# Patient Record
Sex: FEMALE | Race: OTHER
[De-identification: names, ages, dates, MRNs, and addresses within clinical notes are randomized per-mention and may not be internally consistent; named-entity substitution may affect disease eponyms.]

---

## 2017-02-02 ENCOUNTER — APPOINTMENT (OUTPATIENT)
Dept: FAMILY MEDICINE | Facility: CLINIC | Age: 62
End: 2017-02-02

## 2017-02-02 VITALS
SYSTOLIC BLOOD PRESSURE: 114 MMHG | BODY MASS INDEX: 22.97 KG/M2 | TEMPERATURE: 101.7 F | HEART RATE: 97 BPM | WEIGHT: 117 LBS | HEIGHT: 60 IN | OXYGEN SATURATION: 98 % | DIASTOLIC BLOOD PRESSURE: 70 MMHG

## 2017-02-02 LAB
FLUAV SPEC QL CULT: ABNORMAL
FLUBV AG SPEC QL IA: NORMAL

## 2017-02-06 ENCOUNTER — EMERGENCY (EMERGENCY)
Facility: HOSPITAL | Age: 62
LOS: 1 days | Discharge: HOME | End: 2017-02-06
Attending: EMERGENCY MEDICINE
Payer: MEDICAID

## 2017-02-06 VITALS
DIASTOLIC BLOOD PRESSURE: 85 MMHG | HEIGHT: 60 IN | OXYGEN SATURATION: 99 % | WEIGHT: 115.08 LBS | SYSTOLIC BLOOD PRESSURE: 128 MMHG | HEART RATE: 60 BPM | TEMPERATURE: 97 F | RESPIRATION RATE: 18 BRPM

## 2017-02-06 DIAGNOSIS — L03.012 CELLULITIS OF LEFT FINGER: ICD-10-CM

## 2017-02-06 DIAGNOSIS — Z91.018 ALLERGY TO OTHER FOODS: ICD-10-CM

## 2017-02-06 DIAGNOSIS — Z90.89 ACQUIRED ABSENCE OF OTHER ORGANS: ICD-10-CM

## 2017-02-06 DIAGNOSIS — M79.645 PAIN IN LEFT FINGER(S): ICD-10-CM

## 2017-02-06 PROCEDURE — 10060 I&D ABSCESS SIMPLE/SINGLE: CPT

## 2017-02-06 PROCEDURE — 10060 I&D ABSCESS SIMPLE/SINGLE: CPT | Mod: F2

## 2017-02-06 PROCEDURE — 99282 EMERGENCY DEPT VISIT SF MDM: CPT | Mod: 25

## 2017-02-06 RX ORDER — LEVOTHYROXINE SODIUM 125 MCG
0 TABLET ORAL
Qty: 0 | Refills: 0 | COMMUNITY

## 2017-02-06 RX ORDER — CALCITRIOL 0.5 UG/1
0 CAPSULE ORAL
Qty: 0 | Refills: 0 | COMMUNITY

## 2017-02-06 NOTE — ED STATDOCS - OBJECTIVE STATEMENT
62 y/o female presents to ED c/o pain, erythema and swelling to left 3rd finger x 1 week. Pt reports that she pulled a portion of her cuticle from her left 3rd fingernail ~1 week ago and symptoms developed progressively. Pt applied warm compresses to her finger, with no relief. No h/o DM. No further complaints at this time. PMD Dr. Fiona Padron.

## 2017-02-06 NOTE — ED STATDOCS - NS ED MD SCRIBE ATTENDING SCRIBE SECTIONS
REVIEW OF SYSTEMS/VITAL SIGNS( Pullset)/PHYSICAL EXAM/DISPOSITION/HISTORY OF PRESENT ILLNESS/HIV/PAST MEDICAL/SURGICAL/SOCIAL HISTORY

## 2017-02-06 NOTE — ED ADULT TRIAGE NOTE - CHIEF COMPLAINT QUOTE
patient arrived ambulatory to ED , awake, alert and oriented times 3, breathing unlabored.  patient complaining of middle finger to left hand pain, swelling, and redness which has been present for 1 week.  No drainage. No fevers

## 2017-02-06 NOTE — ED STATDOCS - ATTENDING CONTRIBUTION TO CARE
I, Carlos Chaney, performed the initial face to face bedside interview with this patient regarding history of present illness, review of symptoms and relevant past medical, social and family history.  I completed an independent physical examination.  I was the initial provider who evaluated this patient. I have signed out the follow up of any pending tests (i.e. labs, radiological studies) to the ACP.  I have communicated the patient’s plan of care and disposition with the ACP.  The history, relevant review of systems, past medical and surgical history, medical decision making, and physical examination was documented by the scribe in my presence and I attest to the accuracy of the documentation.

## 2017-02-13 ENCOUNTER — APPOINTMENT (OUTPATIENT)
Dept: FAMILY MEDICINE | Facility: CLINIC | Age: 62
End: 2017-02-13

## 2017-02-13 VITALS
HEIGHT: 60 IN | WEIGHT: 116 LBS | HEART RATE: 88 BPM | BODY MASS INDEX: 22.78 KG/M2 | SYSTOLIC BLOOD PRESSURE: 120 MMHG | DIASTOLIC BLOOD PRESSURE: 80 MMHG

## 2017-02-13 RX ORDER — OSELTAMIVIR PHOSPHATE 75 MG/1
75 CAPSULE ORAL TWICE DAILY
Qty: 10 | Refills: 0 | Status: DISCONTINUED | COMMUNITY
Start: 2017-02-02 | End: 2017-02-13

## 2018-01-05 ENCOUNTER — APPOINTMENT (OUTPATIENT)
Dept: FAMILY MEDICINE | Facility: CLINIC | Age: 63
End: 2018-01-05
Payer: MEDICAID

## 2018-01-05 VITALS
SYSTOLIC BLOOD PRESSURE: 120 MMHG | HEART RATE: 84 BPM | BODY MASS INDEX: 23.16 KG/M2 | HEIGHT: 60 IN | DIASTOLIC BLOOD PRESSURE: 82 MMHG | WEIGHT: 118 LBS

## 2018-01-05 DIAGNOSIS — Z87.09 PERSONAL HISTORY OF OTHER DISEASES OF THE RESPIRATORY SYSTEM: ICD-10-CM

## 2018-01-05 DIAGNOSIS — Z87.898 PERSONAL HISTORY OF OTHER SPECIFIED CONDITIONS: ICD-10-CM

## 2018-01-05 DIAGNOSIS — M85.80 OTHER SPECIFIED DISORDERS OF BONE DENSITY AND STRUCTURE, UNSPECIFIED SITE: ICD-10-CM

## 2018-01-05 PROCEDURE — G0008: CPT

## 2018-01-05 PROCEDURE — 99214 OFFICE O/P EST MOD 30 MIN: CPT | Mod: 25

## 2018-01-05 PROCEDURE — 90686 IIV4 VACC NO PRSV 0.5 ML IM: CPT

## 2018-01-09 ENCOUNTER — FORM ENCOUNTER (OUTPATIENT)
Age: 63
End: 2018-01-09

## 2018-01-10 ENCOUNTER — APPOINTMENT (OUTPATIENT)
Dept: RADIOLOGY | Facility: CLINIC | Age: 63
End: 2018-01-10
Payer: MEDICAID

## 2018-01-10 ENCOUNTER — OUTPATIENT (OUTPATIENT)
Dept: OUTPATIENT SERVICES | Facility: HOSPITAL | Age: 63
LOS: 1 days | End: 2018-01-10
Payer: MEDICAID

## 2018-01-10 DIAGNOSIS — M85.80 OTHER SPECIFIED DISORDERS OF BONE DENSITY AND STRUCTURE, UNSPECIFIED SITE: ICD-10-CM

## 2018-01-10 PROCEDURE — 77080 DXA BONE DENSITY AXIAL: CPT

## 2018-01-10 PROCEDURE — 77080 DXA BONE DENSITY AXIAL: CPT | Mod: 26

## 2018-01-16 LAB
25(OH)D3 SERPL-MCNC: 45.7 NG/ML
ALBUMIN SERPL ELPH-MCNC: 4.2 G/DL
ALP BLD-CCNC: 61 U/L
ALT SERPL-CCNC: 32 U/L
ANION GAP SERPL CALC-SCNC: 16 MMOL/L
APPEARANCE: CLEAR
AST SERPL-CCNC: 27 U/L
BASOPHILS # BLD AUTO: 0.02 K/UL
BASOPHILS NFR BLD AUTO: 0.3 %
BILIRUB SERPL-MCNC: 0.6 MG/DL
BILIRUBIN URINE: NEGATIVE
BLOOD URINE: NEGATIVE
BUN SERPL-MCNC: 22 MG/DL
CALCIUM SERPL-MCNC: 8.5 MG/DL
CHLORIDE SERPL-SCNC: 98 MMOL/L
CHOLEST SERPL-MCNC: 229 MG/DL
CHOLEST/HDLC SERPL: 4.7 RATIO
CO2 SERPL-SCNC: 28 MMOL/L
COLOR: YELLOW
CREAT SERPL-MCNC: 0.88 MG/DL
EOSINOPHIL # BLD AUTO: 0.1 K/UL
EOSINOPHIL NFR BLD AUTO: 1.6 %
GLUCOSE QUALITATIVE U: NEGATIVE MG/DL
GLUCOSE SERPL-MCNC: 84 MG/DL
HCT VFR BLD CALC: 43.5 %
HDLC SERPL-MCNC: 49 MG/DL
HEMOCCULT STL QL IA: NEGATIVE
HGB BLD-MCNC: 15 G/DL
IMM GRANULOCYTES NFR BLD AUTO: 0.2 %
KETONES URINE: NEGATIVE
LDLC SERPL CALC-MCNC: 161 MG/DL
LEUKOCYTE ESTERASE URINE: NEGATIVE
LYMPHOCYTES # BLD AUTO: 2.65 K/UL
LYMPHOCYTES NFR BLD AUTO: 41.7 %
MAN DIFF?: NORMAL
MCHC RBC-ENTMCNC: 29.1 PG
MCHC RBC-ENTMCNC: 34.5 GM/DL
MCV RBC AUTO: 84.5 FL
MONOCYTES # BLD AUTO: 0.39 K/UL
MONOCYTES NFR BLD AUTO: 6.1 %
NEUTROPHILS # BLD AUTO: 3.18 K/UL
NEUTROPHILS NFR BLD AUTO: 50.1 %
NITRITE URINE: NEGATIVE
PH URINE: 6.5
PLATELET # BLD AUTO: 199 K/UL
POTASSIUM SERPL-SCNC: 4.3 MMOL/L
PROT SERPL-MCNC: 7.4 G/DL
PROTEIN URINE: NEGATIVE MG/DL
RBC # BLD: 5.15 M/UL
RBC # FLD: 14.8 %
SODIUM SERPL-SCNC: 142 MMOL/L
SPECIFIC GRAVITY URINE: 1.02
TRIGL SERPL-MCNC: 97 MG/DL
UROBILINOGEN URINE: NEGATIVE MG/DL
WBC # FLD AUTO: 6.35 K/UL

## 2018-03-22 ENCOUNTER — APPOINTMENT (OUTPATIENT)
Dept: GASTROENTEROLOGY | Facility: CLINIC | Age: 63
End: 2018-03-22

## 2018-05-02 ENCOUNTER — APPOINTMENT (OUTPATIENT)
Dept: GASTROENTEROLOGY | Facility: CLINIC | Age: 63
End: 2018-05-02
Payer: MEDICAID

## 2018-05-02 VITALS
SYSTOLIC BLOOD PRESSURE: 144 MMHG | WEIGHT: 120 LBS | HEART RATE: 56 BPM | BODY MASS INDEX: 23.56 KG/M2 | HEIGHT: 60 IN | RESPIRATION RATE: 14 BRPM | DIASTOLIC BLOOD PRESSURE: 81 MMHG

## 2018-05-02 PROCEDURE — 99204 OFFICE O/P NEW MOD 45 MIN: CPT

## 2018-05-07 ENCOUNTER — RX RENEWAL (OUTPATIENT)
Age: 63
End: 2018-05-07

## 2018-06-22 ENCOUNTER — APPOINTMENT (OUTPATIENT)
Dept: GASTROENTEROLOGY | Facility: GI CENTER | Age: 63
End: 2018-06-22
Payer: MEDICAID

## 2018-06-22 ENCOUNTER — OUTPATIENT (OUTPATIENT)
Dept: OUTPATIENT SERVICES | Facility: HOSPITAL | Age: 63
LOS: 1 days | End: 2018-06-22
Payer: MEDICAID

## 2018-06-22 ENCOUNTER — RESULT REVIEW (OUTPATIENT)
Age: 63
End: 2018-06-22

## 2018-06-22 VITALS
TEMPERATURE: 98 F | BODY MASS INDEX: 23.56 KG/M2 | RESPIRATION RATE: 14 BRPM | SYSTOLIC BLOOD PRESSURE: 140 MMHG | HEART RATE: 55 BPM | OXYGEN SATURATION: 98 % | DIASTOLIC BLOOD PRESSURE: 80 MMHG | WEIGHT: 120 LBS | HEIGHT: 60 IN

## 2018-06-22 DIAGNOSIS — K63.5 POLYP OF COLON: ICD-10-CM

## 2018-06-22 DIAGNOSIS — Z12.11 ENCOUNTER FOR SCREENING FOR MALIGNANT NEOPLASM OF COLON: ICD-10-CM

## 2018-06-22 PROCEDURE — 88305 TISSUE EXAM BY PATHOLOGIST: CPT

## 2018-06-22 PROCEDURE — 45380 COLONOSCOPY AND BIOPSY: CPT | Mod: 33

## 2018-06-22 PROCEDURE — 45380 COLONOSCOPY AND BIOPSY: CPT | Mod: PT

## 2018-06-22 PROCEDURE — 88305 TISSUE EXAM BY PATHOLOGIST: CPT | Mod: 26

## 2018-06-22 RX ORDER — POLYETHYLENE GLYCOL 3350, SODIUM SULFATE, SODIUM CHLORIDE, POTASSIUM CHLORIDE, ASCORBIC ACID, SODIUM ASCORBATE 7.5-2.691G
100 KIT ORAL
Qty: 1 | Refills: 0 | Status: COMPLETED | COMMUNITY
Start: 2018-05-02 | End: 2018-06-22

## 2018-06-22 RX ORDER — POLYETHYLENE GLYOCOL 3350, SODIUM CHLORIDE, SODIUM BICARBONATE AND POTASSIUM CHLORIDE 420; 11.2; 5.72; 1.48 G/4L; G/4L; G/4L; G/4L
420 POWDER, FOR SOLUTION NASOGASTRIC; ORAL
Qty: 1 | Refills: 0 | Status: COMPLETED | COMMUNITY
Start: 2018-05-07 | End: 2018-06-22

## 2018-06-23 ENCOUNTER — TRANSCRIPTION ENCOUNTER (OUTPATIENT)
Age: 63
End: 2018-06-23

## 2018-06-27 LAB — SURGICAL PATHOLOGY FINAL REPORT - CH: SIGNIFICANT CHANGE UP

## 2018-07-26 ENCOUNTER — OTHER (OUTPATIENT)
Age: 63
End: 2018-07-26

## 2018-08-12 ENCOUNTER — FORM ENCOUNTER (OUTPATIENT)
Age: 63
End: 2018-08-12

## 2018-08-13 ENCOUNTER — APPOINTMENT (OUTPATIENT)
Dept: FAMILY MEDICINE | Facility: CLINIC | Age: 63
End: 2018-08-13
Payer: MEDICAID

## 2018-08-13 ENCOUNTER — OUTPATIENT (OUTPATIENT)
Dept: OUTPATIENT SERVICES | Facility: HOSPITAL | Age: 63
LOS: 1 days | End: 2018-08-13
Payer: MEDICAID

## 2018-08-13 ENCOUNTER — APPOINTMENT (OUTPATIENT)
Dept: RADIOLOGY | Facility: CLINIC | Age: 63
End: 2018-08-13
Payer: MEDICAID

## 2018-08-13 VITALS
WEIGHT: 116 LBS | DIASTOLIC BLOOD PRESSURE: 76 MMHG | OXYGEN SATURATION: 99 % | SYSTOLIC BLOOD PRESSURE: 119 MMHG | HEIGHT: 60 IN | HEART RATE: 53 BPM | BODY MASS INDEX: 22.78 KG/M2

## 2018-08-13 DIAGNOSIS — M25.561 PAIN IN RIGHT KNEE: ICD-10-CM

## 2018-08-13 DIAGNOSIS — Z92.29 PERSONAL HISTORY OF OTHER DRUG THERAPY: ICD-10-CM
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
## 2018-08-13 PROBLEM — E89.0 POSTPROCEDURAL HYPOTHYROIDISM: Chronic | Status: ACTIVE | Noted: 2017-02-06

## 2018-08-13 PROCEDURE — 99213 OFFICE O/P EST LOW 20 MIN: CPT

## 2018-08-13 PROCEDURE — 73562 X-RAY EXAM OF KNEE 3: CPT | Mod: 26,RT

## 2018-08-13 PROCEDURE — 73562 X-RAY EXAM OF KNEE 3: CPT

## 2018-08-13 NOTE — PLAN
[FreeTextEntry1] : AVOID ACTIVITIES CAUSING PAIN\par CHECK XRAY KNEE\par FURTHER RECOMMENDATIONS PENDING RESULTS\par TYLENOL OR MOTRIN PRN AS DIRECTED\par CALL WITH ANY QUESTIONS, CONCERNS OR CHANGES \par FLU VACCINE IN THE FALL RECOMMENDED

## 2018-08-13 NOTE — HISTORY OF PRESENT ILLNESS
[FreeTextEntry8] : PRESENTING FOR ACUTE VISIT.  NOTES THAT THREE WEEKS AGO WHILE IN [facility] FELL AND HIT HER RIGHT KNEE.  HAS IMPROVED BUT STILL HAS PAIN.  NO SWELLING.  NO NUMBNESS/TINGLING.  ONLY PAIN WITH MOVEMENT.  NO MEDICATIONS TAKEN FOR SYMPTOMS.  NO FEVER.  NO PRIOR INJURY.  AT FIRST HAS DIFFICULTY WALKING BUT NOW "MUCH BETTER".  NO OTHER INJURY OR CONCERNS TODAY.

## 2018-08-13 NOTE — PHYSICAL EXAM
[No Acute Distress] : no acute distress [Well Nourished] : well nourished [Well-Appearing] : well-appearing [No Respiratory Distress] : no respiratory distress  [Clear to Auscultation] : lungs were clear to auscultation bilaterally [No Accessory Muscle Use] : no accessory muscle use [Normal Rate] : normal rate  [Regular Rhythm] : with a regular rhythm [Normal S1, S2] : normal S1 and S2 [No Joint Swelling] : no joint swelling [Grossly Normal Strength/Tone] : grossly normal strength/tone [de-identified] : NON-TENDER.  DISTAL PULSES INTACT.

## 2018-08-16 ENCOUNTER — RESULT REVIEW (OUTPATIENT)
Age: 63
End: 2018-08-16

## 2018-09-24 ENCOUNTER — APPOINTMENT (OUTPATIENT)
Dept: GASTROENTEROLOGY | Facility: CLINIC | Age: 63
End: 2018-09-24

## 2018-12-24 ENCOUNTER — APPOINTMENT (OUTPATIENT)
Dept: FAMILY MEDICINE | Facility: CLINIC | Age: 63
End: 2018-12-24
Payer: MEDICAID

## 2018-12-24 VITALS
HEIGHT: 60 IN | WEIGHT: 123 LBS | DIASTOLIC BLOOD PRESSURE: 82 MMHG | OXYGEN SATURATION: 97 % | SYSTOLIC BLOOD PRESSURE: 120 MMHG | TEMPERATURE: 97.5 F | BODY MASS INDEX: 24.15 KG/M2 | HEART RATE: 65 BPM

## 2018-12-24 PROCEDURE — 99213 OFFICE O/P EST LOW 20 MIN: CPT

## 2018-12-24 NOTE — HISTORY OF PRESENT ILLNESS
[Congestion] : congestion [Cold Symptoms] : cold symptoms [Sore Throat] : sore throat [de-identified] : SNEEZING  [FreeTextEntry8] : PATIENT PRESENTS FOR ACUTE VISIT. SHE REPORTS THAT HER SYMPTOMS FIRST BEGAN 3 WEEKS AGO. INITIALLY SNEEZING AND CONGESTION.  HAD RELIEF WITH ALKASELTZER.  NOW COUGH PRESENTED 3 DAYS AGO, WORSE AT NIGHT AND IN THE MORNING.  HAS POSTNASAL DRIP AND SORE THROAT.  TOOK COUGH DROPS.  REPORTS SYMPTOMS ARE IMPROVING.  DENIES ANY CHEST PAIN, SHORTNESS OF BREATH, DIZZINESS, HEADACHES, GI OR URINARY SYMPTOMS.  GRANDCHILD WAS ALSO SICK.  NO OTHER COMPLAINTS TODAY.

## 2018-12-24 NOTE — PHYSICAL EXAM
[No Respiratory Distress] : no respiratory distress  [Clear to Auscultation] : lungs were clear to auscultation bilaterally [No Accessory Muscle Use] : no accessory muscle use [Normal S1, S2] : normal S1 and S2 [No Murmur] : no murmur heard [No Acute Distress] : no acute distress [Well Nourished] : well nourished [Well-Appearing] : well-appearing [Normal Outer Ear/Nose] : the outer ears and nose were normal in appearance [Normal Oropharynx] : the oropharynx was normal [Normal TMs] : both tympanic membranes were normal [Supple] : supple [No Lymphadenopathy] : no lymphadenopathy [de-identified] : SCANT CERUMEN

## 2018-12-24 NOTE — ASSESSMENT
[FreeTextEntry1] : SUPPORTIVE CARE: REST, FLUIDS, STEAM\par MUCINEX\par RX NASAL SPRAY- REVIEWED USE\par LIKELY VIRAL URI; RX ABX TO ONLY FILL IF SYMPTOMS PERSIST/WORSEN - CALL TO DISCUSS\par CALL WITH ANY QUESTIONS, COMMENTS, OR CONCERNS\par FOLLOW-UP FOR FLU VACCINE AND ROUTINE CARE

## 2018-12-24 NOTE — REVIEW OF SYSTEMS
[Sore Throat] : sore throat [Postnasal Drip] : postnasal drip [Negative] : Cardiovascular [Nasal Discharge] : nasal discharge [Cough] : cough [Shortness Of Breath] : no shortness of breath [Wheezing] : no wheezing

## 2018-12-24 NOTE — ADDENDUM
[FreeTextEntry1] : I, Chula Aranda, acted solely as a scribe for Dr. Jesica Padron on this date 12/24/18.\par

## 2019-01-17 ENCOUNTER — OTHER (OUTPATIENT)
Age: 64
End: 2019-01-17

## 2019-01-23 ENCOUNTER — APPOINTMENT (OUTPATIENT)
Dept: FAMILY MEDICINE | Facility: CLINIC | Age: 64
End: 2019-01-23
Payer: MEDICAID

## 2019-01-23 ENCOUNTER — RX RENEWAL (OUTPATIENT)
Age: 64
End: 2019-01-23

## 2019-01-23 VITALS
SYSTOLIC BLOOD PRESSURE: 100 MMHG | HEIGHT: 60 IN | DIASTOLIC BLOOD PRESSURE: 60 MMHG | WEIGHT: 124 LBS | HEART RATE: 84 BPM | BODY MASS INDEX: 24.35 KG/M2
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
## 2019-01-23 DIAGNOSIS — J06.9 ACUTE UPPER RESPIRATORY INFECTION, UNSPECIFIED: ICD-10-CM

## 2019-01-23 PROCEDURE — 90686 IIV4 VACC NO PRSV 0.5 ML IM: CPT

## 2019-01-23 PROCEDURE — 99396 PREV VISIT EST AGE 40-64: CPT | Mod: 25

## 2019-01-23 PROCEDURE — G0008: CPT

## 2019-01-23 RX ORDER — AMOXICILLIN 500 MG/1
500 TABLET, FILM COATED ORAL 3 TIMES DAILY
Qty: 30 | Refills: 0 | Status: DISCONTINUED | COMMUNITY
Start: 2018-12-24 | End: 2019-01-23

## 2019-01-23 NOTE — ADDENDUM
[FreeTextEntry1] : I, Chula Aranda, acted solely as a scribe for Dr. Jesica Padron on this date 1/23/19.

## 2019-01-23 NOTE — PHYSICAL EXAM
[No Acute Distress] : no acute distress [Well Nourished] : well nourished [Well-Appearing] : well-appearing [Normal Sclera/Conjunctiva] : normal sclera/conjunctiva [PERRL] : pupils equal round and reactive to light [Normal Outer Ear/Nose] : the outer ears and nose were normal in appearance [Normal Oropharynx] : the oropharynx was normal [No JVD] : no jugular venous distention [Supple] : supple [No Lymphadenopathy] : no lymphadenopathy [Thyroid Normal, No Nodules] : the thyroid was normal and there were no nodules present [No Respiratory Distress] : no respiratory distress  [Clear to Auscultation] : lungs were clear to auscultation bilaterally [No Accessory Muscle Use] : no accessory muscle use [Normal Rate] : normal rate  [Regular Rhythm] : with a regular rhythm [Normal S1, S2] : normal S1 and S2 [No Murmur] : no murmur heard [Pedal Pulses Present] : the pedal pulses are present [Soft] : abdomen soft [Non Tender] : non-tender [Normal Bowel Sounds] : normal bowel sounds [Normal Gait] : normal gait [Coordination Grossly Intact] : coordination grossly intact [No Focal Deficits] : no focal deficits [Deep Tendon Reflexes (DTR)] : deep tendon reflexes were 2+ and symmetric [Speech Grossly Normal] : speech grossly normal [Normal Affect] : the affect was normal [Normal Mood] : the mood was normal

## 2019-01-23 NOTE — ASSESSMENT
[FreeTextEntry1] : RX ROUTINE LAB WORK AND URINALYSIS\par VISION SCREENING\par FLU VACCINE GIVEN AND TOLERATED WELL \par FOLLOW UP ALL SPECIALISTS AS DIRECTED\par CONSIDER SHINGRIX VACCINE \par CALL WITH ANY QUESTIONS, CONCERNS OR CHANGES

## 2019-01-23 NOTE — HISTORY OF PRESENT ILLNESS
[FreeTextEntry1] : PHYSICAL  [de-identified] : PRESENTING FOR YEARLY WELLNESS EXAM. DOING WELL.  SEEING GYN ROUTINELY. HAD TRANSVAGINAL US YESTERDAY, GOES FOR ROUTINE BREAST IMAGING.  CHRONIC HISTORY OF HYPOTHYROIDISM.  CONTINUES ON SYNTHROID.   DENIES FEVER, CHEST PAIN, SHORTNESS OF BREATH, HEADACHE, DIZZINESS, GI OR URINARY SYMPTOMS. NO OTHER SYMPTOMS. WOULD LIKE FLU SHOT TODAY.  NO PRIOR ADVERSE REACTIONS TO VACCINATIONS.

## 2019-01-23 NOTE — HEALTH RISK ASSESSMENT
[Very Good] : ~his/her~  mood as very good [No falls in past year] : Patient reported no falls in the past year [0] : 2) Feeling down, depressed, or hopeless: Not at all (0) [Patient reported PAP Smear was normal] : Patient reported PAP Smear was normal [Patient reported colonoscopy was normal] : Patient reported colonoscopy was normal [HIV test declined] : HIV test declined [Hepatitis C test declined] : Hepatitis C test declined [None] : None [With Significant Other] : lives with significant other [# of Members in Household ___] :  household currently consist of [unfilled] member(s) [Retired] : retired [College] : College [] :  [# Of Children ___] : has [unfilled] children [Feels Safe at Home] : Feels safe at home [Fully functional (bathing, dressing, toileting, transferring, walking, feeding)] : Fully functional (bathing, dressing, toileting, transferring, walking, feeding) [Fully functional (using the telephone, shopping, preparing meals, housekeeping, doing laundry, using] : Fully functional and needs no help or supervision to perform IADLs (using the telephone, shopping, preparing meals, housekeeping, doing laundry, using transportation, managing medications and managing finances) [Smoke Detector] : smoke detector [Carbon Monoxide Detector] : carbon monoxide detector [Seat Belt] :  uses seat belt [Sunscreen] : uses sunscreen [Discussed at today's visit] : Advance Directives Discussed at today's visit [Patient reported mammogram was normal] : Patient reported mammogram was normal [Safety elements used in home] : safety elements used in home [] : No [[id number]] : 0 [[id number]] : 0 [Change in mental status noted] : No change in mental status noted [Language] : denies difficulty with language [Behavior] : denies difficulty with behavior [Learning/Retaining New Information] : denies difficulty learning/retaining new information [Handling Complex Tasks] : denies difficulty handling complex tasks [Reasoning] : denies difficulty with reasoning [Spatial Ability and Orientation] : denies difficulty with spatial ability and orientation [Reports changes in hearing] : Reports no changes in hearing [Reports changes in vision] : Reports no changes in vision [Reports changes in dental health] : Reports no changes in dental health [MammogramDate] : 01/19 [PapSmearDate] : 01/19  [BoneDensityDate] : 01/18 [ColonoscopyDate] : 07/18 [de-identified] : WEARS GLASSES FOR READING AND DISTANCE

## 2019-01-25 ENCOUNTER — OTHER (OUTPATIENT)
Age: 64
End: 2019-01-25

## 2019-01-25 LAB
BASOPHILS # BLD AUTO: 0.02 K/UL
BASOPHILS NFR BLD AUTO: 0.4 %
EOSINOPHIL # BLD AUTO: 0.14 K/UL
EOSINOPHIL NFR BLD AUTO: 2.7 %
HCT VFR BLD CALC: 42.6 %
HGB BLD-MCNC: 13.9 G/DL
IMM GRANULOCYTES NFR BLD AUTO: 0.2 %
LYMPHOCYTES # BLD AUTO: 1.89 K/UL
LYMPHOCYTES NFR BLD AUTO: 36.9 %
MAN DIFF?: NORMAL
MCHC RBC-ENTMCNC: 28 PG
MCHC RBC-ENTMCNC: 32.6 GM/DL
MCV RBC AUTO: 85.9 FL
MONOCYTES # BLD AUTO: 0.3 K/UL
MONOCYTES NFR BLD AUTO: 5.9 %
NEUTROPHILS # BLD AUTO: 2.76 K/UL
NEUTROPHILS NFR BLD AUTO: 53.9 %
PLATELET # BLD AUTO: 206 K/UL
RBC # BLD: 4.96 M/UL
RBC # FLD: 15.1 %
WBC # FLD AUTO: 5.12 K/UL

## 2019-02-12 LAB
25(OH)D3 SERPL-MCNC: 14 NG/ML
ALBUMIN SERPL ELPH-MCNC: 4.1 G/DL
ALP BLD-CCNC: 63 U/L
ALT SERPL-CCNC: 33 U/L
ANION GAP SERPL CALC-SCNC: 12 MMOL/L
AST SERPL-CCNC: 23 U/L
BILIRUB SERPL-MCNC: 0.3 MG/DL
BUN SERPL-MCNC: 23 MG/DL
CALCIUM SERPL-MCNC: 8.2 MG/DL
CHLORIDE SERPL-SCNC: 102 MMOL/L
CHOLEST SERPL-MCNC: 218 MG/DL
CHOLEST/HDLC SERPL: 4.4 RATIO
CO2 SERPL-SCNC: 26 MMOL/L
CREAT SERPL-MCNC: 0.73 MG/DL
GLUCOSE SERPL-MCNC: 84 MG/DL
HDLC SERPL-MCNC: 49 MG/DL
LDLC SERPL CALC-MCNC: 152 MG/DL
POTASSIUM SERPL-SCNC: 4.2 MMOL/L
PROT SERPL-MCNC: 6.9 G/DL
SODIUM SERPL-SCNC: 139 MMOL/L
TRIGL SERPL-MCNC: 85 MG/DL

## 2019-03-01 ENCOUNTER — OUTPATIENT (OUTPATIENT)
Dept: OUTPATIENT SERVICES | Facility: HOSPITAL | Age: 64
LOS: 1 days | End: 2019-03-01

## 2019-03-11 ENCOUNTER — APPOINTMENT (OUTPATIENT)
Dept: ORTHOPEDIC SURGERY | Facility: CLINIC | Age: 64
End: 2019-03-11
Payer: MEDICAID

## 2019-03-11 VITALS
HEART RATE: 48 BPM | DIASTOLIC BLOOD PRESSURE: 78 MMHG | WEIGHT: 124 LBS | BODY MASS INDEX: 24.35 KG/M2 | SYSTOLIC BLOOD PRESSURE: 130 MMHG | HEIGHT: 60 IN

## 2019-03-11 DIAGNOSIS — M18.12 UNILATERAL PRIMARY OSTEOARTHRITIS OF FIRST CARPOMETACARPAL JOINT, RIGHT HAND: ICD-10-CM

## 2019-03-11 DIAGNOSIS — M18.11 UNILATERAL PRIMARY OSTEOARTHRITIS OF FIRST CARPOMETACARPAL JOINT, RIGHT HAND: ICD-10-CM

## 2019-03-11 PROCEDURE — 73140 X-RAY EXAM OF FINGER(S): CPT | Mod: F5

## 2019-03-11 PROCEDURE — 99204 OFFICE O/P NEW MOD 45 MIN: CPT

## 2019-03-11 NOTE — ASSESSMENT
[FreeTextEntry1] : the patient is a 63-year-old female with pain at the base of bilateral thumbs consistent with mild basal joint arthritis. Recommend conservative treatment at this time with use of a comfort cool immobilizer as needed as well as a course of anti-inflammatory medication.  if this is not adequately controlling her pain she will return for possible cortisone injection.

## 2019-03-11 NOTE — PHYSICAL EXAM
[Normal RUE] : Right Upper Extremity: No scars, rashes, lesions, ulcers, skin intact [Normal LUE] : Left Upper Extremity: No scars, rashes, lesions, ulcers, skin intact [Normal Finger/nose] : finger to nose coordination [Normal] : no peripheral adenopathy appreciated [de-identified] : There is tenderness over the bilateral basal joints with a positive grind test, no shoulder deformity. There is a positive crank test. There is no swelling appreciated over the CMC joints. There is no evidence of infection or lymphadenopathy bilaterally to the level of the elbows There is no evidence of MCP hyperextension bilaterally. There is a negative Finkelstein's test There is no tenderness over the A-1 pulleys bilaterally. 5/5 stregnth bilaterally. \par \par The wrists have a symmetric and full range of motion bilaterally with no pain upon forced flexion, extension, pronation and supination. There is no tenderness over the scaphoid scapholunate region ligaments and no tenderness of the TFCC bilaterally. There is no tenderness of the pisotriquetral hamate hook. The second through fifth CMC his is stable and nontender bilaterally.\par There is a negative carpal tunnel compression test or Tinel's bilaterally.   [de-identified] : elyser [de-identified] : 3 x-ray views of bilateral thumbs are reviewed there is mild joint space narrowing of the basal joints bilaterally no fracture or dislocation

## 2019-03-11 NOTE — HISTORY OF PRESENT ILLNESS
[FreeTextEntry1] : the patient is a 63-year-old female who presents for evaluation of bilateral thumb pain right worse than left. She has a dull pain located at the base of the thumbs with circumferential radiation.  Her pain hasn't present for several weeks she denies trauma once I had. Her pain is worse with activity and improved with rest. She denies paresthesias.

## 2019-04-24 DIAGNOSIS — Z71.89 OTHER SPECIFIED COUNSELING: ICD-10-CM

## 2019-05-31 ENCOUNTER — OTHER (OUTPATIENT)
Age: 64
End: 2019-05-31

## 2019-10-27 ENCOUNTER — FORM ENCOUNTER (OUTPATIENT)
Age: 64
End: 2019-10-27

## 2019-10-28 ENCOUNTER — APPOINTMENT (OUTPATIENT)
Dept: FAMILY MEDICINE | Facility: CLINIC | Age: 64
End: 2019-10-28
Payer: MEDICAID

## 2019-10-28 ENCOUNTER — APPOINTMENT (OUTPATIENT)
Dept: RADIOLOGY | Facility: CLINIC | Age: 64
End: 2019-10-28
Payer: MEDICAID

## 2019-10-28 ENCOUNTER — OUTPATIENT (OUTPATIENT)
Dept: OUTPATIENT SERVICES | Facility: HOSPITAL | Age: 64
LOS: 1 days | End: 2019-10-28
Payer: MEDICAID

## 2019-10-28 VITALS
HEART RATE: 60 BPM | WEIGHT: 124 LBS | BODY MASS INDEX: 23.41 KG/M2 | HEIGHT: 61 IN | DIASTOLIC BLOOD PRESSURE: 70 MMHG | SYSTOLIC BLOOD PRESSURE: 128 MMHG

## 2019-10-28 DIAGNOSIS — M79.674 PAIN IN RIGHT TOE(S): ICD-10-CM

## 2019-10-28 DIAGNOSIS — Z92.29 PERSONAL HISTORY OF OTHER DRUG THERAPY: ICD-10-CM

## 2019-10-28 PROCEDURE — 73630 X-RAY EXAM OF FOOT: CPT

## 2019-10-28 PROCEDURE — 90471 IMMUNIZATION ADMIN: CPT

## 2019-10-28 PROCEDURE — 99214 OFFICE O/P EST MOD 30 MIN: CPT | Mod: 25

## 2019-10-28 PROCEDURE — 90686 IIV4 VACC NO PRSV 0.5 ML IM: CPT

## 2019-10-28 PROCEDURE — 73630 X-RAY EXAM OF FOOT: CPT | Mod: 26,RT

## 2019-10-28 NOTE — REVIEW OF SYSTEMS
[Joint Pain] : joint pain [Fever] : no fever [Chills] : no chills [Fatigue] : no fatigue [Chest Pain] : no chest pain [Palpitations] : no palpitations [Lower Ext Edema] : no lower extremity edema [Shortness Of Breath] : no shortness of breath [Wheezing] : no wheezing [Cough] : no cough [Abdominal Pain] : no abdominal pain [Diarrhea] : diarrhea [Vomiting] : no vomiting [Muscle Weakness] : no muscle weakness [Muscle Pain] : no muscle pain [Back Pain] : no back pain [Headache] : no headache [Dizziness] : no dizziness [Fainting] : no fainting [Easy Bleeding] : no easy bleeding [Easy Bruising] : no easy bruising [FreeTextEntry9] : SEE HPI.

## 2019-10-28 NOTE — HISTORY OF PRESENT ILLNESS
[FreeTextEntry1] : RIGHT FOOT PAIN. [de-identified] : MS. BAZZI IS A PLEASANT 62 YO PRESENTING FOR FOLLOW-UP. TODAY WITH COMPLAINT OF INTERMITTENT SHARP PAIN TO THE BASE OF THE FIRST TOE ON THE RIGHT FOOT. SYMPTOMS INITIALLY PRESENTED 3 MONTHS AGO. NO SWELLING OR REDNESS TO THE AREA. NO NUMBNESS/TINGLING TO THE EXTREMITIES. DENIES FALL OR TRAUMA TO THE AREA. NO OTHER MUSCLE/JOINT PAINS.  IS TAKING VITAMIN D SUPPLEMENTS FOR VITAMIN D INSUFFICIENCY.  HAS HAD NO CHEST PAIN, PALPITATIONS, SHORTNESS OF BREATH, HEADACHE, DIZZINESS, GI OR URINARY COMPLAINTS. WOULD LIKE FLU VACCINE. NO OTHER COMPLAINTS TODAY.

## 2019-10-28 NOTE — PLAN
[FreeTextEntry1] : FLU VACCINE GIVEN AND TOLERATED WELL \par CHECK LAB WORK FOR URIC ACID AND VITAMIN D\par CHECK RIGHT FOOT X-RAY - FURTHER RECOMMENDATIONS PENDING RESULTS \par DISCUSSED PODIATRIST EVALUATION\par FOLLOW-UP ALL SPECIALISTS AS DIRECTED \par CALL WITH ANY QUESTIONS, CONCERNS OR CHANGES.

## 2019-10-30 ENCOUNTER — TRANSCRIPTION ENCOUNTER (OUTPATIENT)
Age: 64
End: 2019-10-30

## 2020-10-05 ENCOUNTER — APPOINTMENT (OUTPATIENT)
Dept: FAMILY MEDICINE | Facility: CLINIC | Age: 65
End: 2020-10-05
Payer: MEDICAID

## 2020-10-05 ENCOUNTER — MED ADMIN CHARGE (OUTPATIENT)
Age: 65
End: 2020-10-05

## 2020-10-05 VITALS
BODY MASS INDEX: 22.09 KG/M2 | HEIGHT: 61 IN | HEART RATE: 66 BPM | WEIGHT: 117 LBS | SYSTOLIC BLOOD PRESSURE: 130 MMHG | DIASTOLIC BLOOD PRESSURE: 70 MMHG

## 2020-10-05 DIAGNOSIS — E55.9 VITAMIN D DEFICIENCY, UNSPECIFIED: ICD-10-CM

## 2020-10-05 DIAGNOSIS — Z23 ENCOUNTER FOR IMMUNIZATION: ICD-10-CM

## 2020-10-05 DIAGNOSIS — Z12.39 ENCOUNTER FOR OTHER SCREENING FOR MALIGNANT NEOPLASM OF BREAST: ICD-10-CM

## 2020-10-05 PROCEDURE — 99213 OFFICE O/P EST LOW 20 MIN: CPT | Mod: 25

## 2020-10-05 PROCEDURE — 90686 IIV4 VACC NO PRSV 0.5 ML IM: CPT

## 2020-10-05 PROCEDURE — 90471 IMMUNIZATION ADMIN: CPT

## 2020-10-06 PROBLEM — E55.9 VITAMIN D INSUFFICIENCY: Status: ACTIVE | Noted: 2019-10-28

## 2020-10-06 PROBLEM — Z23 INFLUENZA VACCINE NEEDED: Status: ACTIVE | Noted: 2019-10-28

## 2020-10-06 PROBLEM — Z12.39 BREAST SCREENING: Status: ACTIVE | Noted: 2019-01-17

## 2020-10-06 NOTE — PHYSICAL EXAM
[No Acute Distress] : no acute distress [Well Nourished] : well nourished [Well-Appearing] : well-appearing [No Lymphadenopathy] : no lymphadenopathy [Supple] : supple [No Respiratory Distress] : no respiratory distress  [No Accessory Muscle Use] : no accessory muscle use [Clear to Auscultation] : lungs were clear to auscultation bilaterally [Normal Rate] : normal rate  [Regular Rhythm] : with a regular rhythm [Normal S1, S2] : normal S1 and S2

## 2020-10-06 NOTE — PLAN
[FreeTextEntry1] : FLU VACCINE GIVEN AND TOLERATED WELL\par CHECK BREAST IMAGING\par CHECK BONE DENSITY\par NEED ENDOCRINOLOGY CONSULTANT NOTES FOR REVIEW\par FOLLOW-UP ALL SPECIALISTS AS DIRECTED \par CALL WITH ANY QUESTIONS, CONCERNS OR CHANGES

## 2020-10-06 NOTE — HISTORY OF PRESENT ILLNESS
[FreeTextEntry1] : FLU VACCINE [de-identified] : MS. BAZZI IS A PLEASANT 63 YO PRESENTING FOR FOLLOW-UP AND FOR FLU VACCINE.  HAS BEEN EATING HEALTHIER.  EXERCISING - DANCING.  SAW ENDOCRINOLOGIST TWO WEEKS AGO DR. VALVERDE. HAD LAB WORK DONE.  MONITORING CALCIUM LEVELS AND THYROID.  IS DUE FOR MAMMOGRAM.  HAS NO BREAST COMPLAINTS.  NO PAIN, NIPPLE DISCHARGE, SKIN CHANGES OR LUMPS.  ALSO DUE TO SEE GYN AND GO FOR BONE DENSITY.  HISTORY OF VITAMIN D INSUFFICIENCY ON VITAMIN D SUPPLEMENTS.  NO NEW COMPLAINTS TODAY.

## 2020-10-10 LAB — HEMOCCULT STL QL IA: NEGATIVE

## 2020-12-02 ENCOUNTER — OUTPATIENT (OUTPATIENT)
Dept: OUTPATIENT SERVICES | Facility: HOSPITAL | Age: 65
LOS: 1 days | End: 2020-12-02

## 2020-12-02 DIAGNOSIS — Z00.8 ENCOUNTER FOR OTHER GENERAL EXAMINATION: ICD-10-CM

## 2020-12-04 ENCOUNTER — TRANSCRIPTION ENCOUNTER (OUTPATIENT)
Age: 65
End: 2020-12-04

## 2020-12-08 ENCOUNTER — TRANSCRIPTION ENCOUNTER (OUTPATIENT)
Age: 65
End: 2020-12-08

## 2021-01-07 ENCOUNTER — OUTPATIENT (OUTPATIENT)
Dept: OUTPATIENT SERVICES | Facility: HOSPITAL | Age: 66
LOS: 1 days | End: 2021-01-07
Payer: MEDICAID

## 2021-01-07 ENCOUNTER — APPOINTMENT (OUTPATIENT)
Dept: RADIOLOGY | Facility: CLINIC | Age: 66
End: 2021-01-07
Payer: MEDICAID

## 2021-01-07 DIAGNOSIS — Z00.8 ENCOUNTER FOR OTHER GENERAL EXAMINATION: ICD-10-CM

## 2021-01-07 DIAGNOSIS — E55.9 VITAMIN D DEFICIENCY, UNSPECIFIED: ICD-10-CM

## 2021-01-07 PROCEDURE — 77080 DXA BONE DENSITY AXIAL: CPT

## 2021-01-07 PROCEDURE — 77080 DXA BONE DENSITY AXIAL: CPT | Mod: 26

## 2021-06-17 ENCOUNTER — APPOINTMENT (OUTPATIENT)
Dept: FAMILY MEDICINE | Facility: CLINIC | Age: 66
End: 2021-06-17

## 2021-07-26 ENCOUNTER — TRANSCRIPTION ENCOUNTER (OUTPATIENT)
Age: 66
End: 2021-07-26

## 2021-09-17 ENCOUNTER — APPOINTMENT (OUTPATIENT)
Dept: FAMILY MEDICINE | Facility: CLINIC | Age: 66
End: 2021-09-17

## 2021-09-17 NOTE — REVIEW OF SYSTEMS
[Joint Pain] : joint pain [Fever] : no fever [Fatigue] : no fatigue [Chest Pain] : no chest pain There are no Wet Read(s) to document. [Palpitations] : no palpitations [Shortness Of Breath] : no shortness of breath [Cough] : no cough [Muscle Pain] : no muscle pain

## 2021-10-01 NOTE — ED ADULT NURSE NOTE - OBJECTIVE STATEMENT
10/01/21 0600   Sleep Analysis   Sleep/Wake Cycle Unremarkable   Hours Slept 8.5  (asleep by 2130)   PT fell asleep without incident and offered no complaints through the night.  Has been safe and in control.  Asleep for C-SSRS   pt alert and awake x3, arrved to ED with finger pain/swelling. pt states she picked out the skin in between the nail and finger and now its swollen. pt presents with redness and some puss at nail bed of left 2nd digit, denies fever/chills, denies chest pain/sob

## 2021-10-11 ENCOUNTER — TRANSCRIPTION ENCOUNTER (OUTPATIENT)
Age: 66
End: 2021-10-11

## 2021-12-22 ENCOUNTER — TRANSCRIPTION ENCOUNTER (OUTPATIENT)
Age: 66
End: 2021-12-22

## 2022-04-14 ENCOUNTER — APPOINTMENT (OUTPATIENT)
Dept: FAMILY MEDICINE | Facility: CLINIC | Age: 67
End: 2022-04-14

## 2022-05-03 ENCOUNTER — NON-APPOINTMENT (OUTPATIENT)
Age: 67
End: 2022-05-03

## 2022-05-03 ENCOUNTER — APPOINTMENT (OUTPATIENT)
Dept: FAMILY MEDICINE | Facility: CLINIC | Age: 67
End: 2022-05-03
Payer: MEDICARE

## 2022-05-03 VITALS
DIASTOLIC BLOOD PRESSURE: 82 MMHG | OXYGEN SATURATION: 98 % | SYSTOLIC BLOOD PRESSURE: 128 MMHG | WEIGHT: 120 LBS | HEART RATE: 84 BPM | HEIGHT: 61 IN | BODY MASS INDEX: 22.66 KG/M2

## 2022-05-03 DIAGNOSIS — Z87.81 PERSONAL HISTORY OF (HEALED) TRAUMATIC FRACTURE: ICD-10-CM

## 2022-05-03 DIAGNOSIS — M79.674 PAIN IN RIGHT TOE(S): ICD-10-CM

## 2022-05-03 DIAGNOSIS — Z00.00 ENCOUNTER FOR GENERAL ADULT MEDICAL EXAMINATION W/OUT ABNORMAL FINDINGS: ICD-10-CM

## 2022-05-03 PROCEDURE — G0402 INITIAL PREVENTIVE EXAM: CPT

## 2022-05-03 RX ORDER — CALCIUM CARBONATE 600 MG
TABLET ORAL
Refills: 0 | Status: DISCONTINUED | COMMUNITY
End: 2022-05-03

## 2022-05-03 RX ORDER — ZOSTER VACCINE RECOMBINANT, ADJUVANTED 50 MCG/0.5
50 KIT INTRAMUSCULAR
Qty: 1 | Refills: 0 | Status: DISCONTINUED | COMMUNITY
Start: 2019-01-25 | End: 2022-05-03

## 2022-05-03 RX ORDER — FLUTICASONE PROPIONATE 50 UG/1
50 SPRAY, METERED NASAL DAILY
Qty: 16 | Refills: 0 | Status: DISCONTINUED | COMMUNITY
Start: 2018-12-24 | End: 2022-05-03

## 2022-05-03 NOTE — HISTORY OF PRESENT ILLNESS
[FreeTextEntry1] : wellness exam [de-identified] : MS. BAZZI IS A PLEASANT 67 YO PRESENTING FOR YEARLY WELLNESS EXAM.  IS ON CRESTOR FROM CARDIOLOGY.  TAKING SYNTHROID FOR HYPOTHYROIDISM.  ROUTINELY FOLLOWING WITH ENDOCRINOLOGY.\par \par CARDIOLOGY: DR. TERRELL KWAN; [facility]; LAST SEEN IN FEBRUARY\par PLANS FOR NEXT COLONOSCOPY THIS SUMMER

## 2022-05-03 NOTE — PHYSICAL EXAM
[No Acute Distress] : no acute distress [Well Nourished] : well nourished [Well-Appearing] : well-appearing [Normal Sclera/Conjunctiva] : normal sclera/conjunctiva [PERRL] : pupils equal round and reactive to light [Normal Outer Ear/Nose] : the outer ears and nose were normal in appearance [Normal Oropharynx] : the oropharynx was normal [Normal TMs] : both tympanic membranes were normal [No Lymphadenopathy] : no lymphadenopathy [Supple] : supple [No Respiratory Distress] : no respiratory distress  [No Accessory Muscle Use] : no accessory muscle use [Clear to Auscultation] : lungs were clear to auscultation bilaterally [Normal Rate] : normal rate  [Regular Rhythm] : with a regular rhythm [Normal S1, S2] : normal S1 and S2 [Non Tender] : non-tender [Normal Bowel Sounds] : normal bowel sounds [No Joint Swelling] : no joint swelling [No Rash] : no rash [Coordination Grossly Intact] : coordination grossly intact [No Focal Deficits] : no focal deficits [Normal Gait] : normal gait [Deep Tendon Reflexes (DTR)] : deep tendon reflexes were 2+ and symmetric [Speech Grossly Normal] : speech grossly normal [Memory Grossly Normal] : memory grossly normal [Normal Mood] : the mood was normal

## 2022-05-03 NOTE — PLAN
[FreeTextEntry1] : HAD RECENT EKG WITH CARDIOLOGY\par PNEUMONIA VACCINE DECLINED TODAY\par DISCUSSED COVID AND FLU VACCINE AS WELL\par VISION SCREENING\par SAFETY / HEALTHY HABITS REVIEWED\par HEALTHY DIET AND LIFESTYLE MODIFICATIONS\par CHECK ROUTINE LAB WORK\par NEED MAMMOGRAM RESULTS\par NEED CARDIOLOGY AND ENDOCRINOLOGY CONSULTANT NOTES\par COLONOSCOPY\par FOLLOW-UP ALL SPECIALISTS AS DIRECTED \par CALL WITH ANY QUESTIONS, CONCERNS OR CHANGES \par

## 2022-05-03 NOTE — HEALTH RISK ASSESSMENT
[Very Good] : ~his/her~ current health as very good [Excellent] : ~his/her~  mood as  excellent [Never] : Never [No] : In the past 12 months have you used drugs other than those required for medical reasons? No [No falls in past year] : Patient reported no falls in the past year [0] : 2) Feeling down, depressed, or hopeless: Not at all (0) [PHQ-2 Negative - No further assessment needed] : PHQ-2 Negative - No further assessment needed [HIV test declined] : HIV test declined [Hepatitis C test declined] : Hepatitis C test declined [None] : None [Alone] : lives alone [Retired] : retired [Graduate School] : graduate school [] :  [# Of Children ___] : has [unfilled] children [Feels Safe at Home] : Feels safe at home [Fully functional (bathing, dressing, toileting, transferring, walking, feeding)] : Fully functional (bathing, dressing, toileting, transferring, walking, feeding) [Fully functional (using the telephone, shopping, preparing meals, housekeeping, doing laundry, using] : Fully functional and needs no help or supervision to perform IADLs (using the telephone, shopping, preparing meals, housekeeping, doing laundry, using transportation, managing medications and managing finances) [Reports normal functional visual acuity (ie: able to read med bottle)] : Reports normal functional visual acuity [Smoke Detector] : smoke detector [Carbon Monoxide Detector] : carbon monoxide detector [Safety elements used in home] : safety elements used in home [Seat Belt] :  uses seat belt [Sunscreen] : uses sunscreen [With Patient/Caregiver] : , with patient/caregiver [de-identified] : GOES TO THE GYM AND WALKING, BICYCLE, WEIGHTS; MOST DAYS [de-identified] : GOOD DIET.  LOTS OF VEGETABLES, SALMON, FRUIT, BEANS [[id number]] : 0 [Change in mental status noted] : No change in mental status noted [Language] : denies difficulty with language [Learning/Retaining New Information] : denies difficulty learning/retaining new information [Handling Complex Tasks] : denies difficulty handling complex tasks [Sexually Active] : not sexually active [High Risk Behavior] : no high risk behavior [Reports changes in hearing] : Reports no changes in hearing [Reports changes in vision] : Reports no changes in vision [Reports changes in dental health] : Reports no changes in dental health [MammogramDate] : 02/22 [MammogramComments] : JEANNETTEUH [PapSmearDate] : 02/22 [BoneDensityDate] : 02/22 [ColonoscopyDate] : 06/18 [de-identified] : GLASSES, CHRONIC LOSS DISTANCE LEFT, CHRONIC LOSS READING RIGHT - SEES OPHTHALMOLOGY [de-identified] : SEES DENTIST ROUTINELY [AdvancecareDate] : 05/22

## 2022-05-04 ENCOUNTER — LABORATORY RESULT (OUTPATIENT)
Age: 67
End: 2022-05-04

## 2022-05-04 LAB
25(OH)D3 SERPL-MCNC: 41.5 NG/ML
ALBUMIN SERPL ELPH-MCNC: 4.4 G/DL
ALP BLD-CCNC: 71 U/L
ALT SERPL-CCNC: 47 U/L
ANION GAP SERPL CALC-SCNC: 13 MMOL/L
APPEARANCE: CLEAR
AST SERPL-CCNC: 34 U/L
BASOPHILS # BLD AUTO: 0.05 K/UL
BASOPHILS NFR BLD AUTO: 0.7 %
BILIRUB SERPL-MCNC: 0.4 MG/DL
BILIRUBIN URINE: NEGATIVE
BLOOD URINE: NEGATIVE
BUN SERPL-MCNC: 13 MG/DL
CALCIUM SERPL-MCNC: 8.6 MG/DL
CHLORIDE SERPL-SCNC: 103 MMOL/L
CHOLEST SERPL-MCNC: 120 MG/DL
CO2 SERPL-SCNC: 27 MMOL/L
COLOR: COLORLESS
CREAT SERPL-MCNC: 0.75 MG/DL
EGFR: 88 ML/MIN/1.73M2
EOSINOPHIL # BLD AUTO: 0.13 K/UL
EOSINOPHIL NFR BLD AUTO: 1.9 %
ESTIMATED AVERAGE GLUCOSE: 120 MG/DL
GLUCOSE QUALITATIVE U: NEGATIVE
GLUCOSE SERPL-MCNC: 85 MG/DL
HBA1C MFR BLD HPLC: 5.8 %
HCT VFR BLD CALC: 47.4 %
HDLC SERPL-MCNC: 55 MG/DL
HEMOCCULT STL QL IA: NEGATIVE
HGB BLD-MCNC: 15.8 G/DL
IMM GRANULOCYTES NFR BLD AUTO: 0.3 %
KETONES URINE: NEGATIVE
LDLC SERPL CALC-MCNC: 52 MG/DL
LEUKOCYTE ESTERASE URINE: ABNORMAL
LYMPHOCYTES # BLD AUTO: 3.02 K/UL
LYMPHOCYTES NFR BLD AUTO: 43.8 %
MAN DIFF?: NORMAL
MCHC RBC-ENTMCNC: 29.2 PG
MCHC RBC-ENTMCNC: 33.3 GM/DL
MCV RBC AUTO: 87.5 FL
MONOCYTES # BLD AUTO: 0.44 K/UL
MONOCYTES NFR BLD AUTO: 6.4 %
NEUTROPHILS # BLD AUTO: 3.23 K/UL
NEUTROPHILS NFR BLD AUTO: 46.9 %
NITRITE URINE: NEGATIVE
NONHDLC SERPL-MCNC: 65 MG/DL
PH URINE: 8
PLATELET # BLD AUTO: 226 K/UL
POTASSIUM SERPL-SCNC: 4.4 MMOL/L
PROT SERPL-MCNC: 7.1 G/DL
PROTEIN URINE: NEGATIVE
RBC # BLD: 5.42 M/UL
RBC # FLD: 14.4 %
SODIUM SERPL-SCNC: 143 MMOL/L
SPECIFIC GRAVITY URINE: 1.01
TRIGL SERPL-MCNC: 68 MG/DL
TSH SERPL-ACNC: 0.68 UIU/ML
UROBILINOGEN URINE: NORMAL
WBC # FLD AUTO: 6.89 K/UL

## 2022-05-26 ENCOUNTER — APPOINTMENT (OUTPATIENT)
Dept: FAMILY MEDICINE | Facility: CLINIC | Age: 67
End: 2022-05-26
Payer: MEDICARE

## 2022-05-26 VITALS
HEIGHT: 61 IN | WEIGHT: 123 LBS | SYSTOLIC BLOOD PRESSURE: 140 MMHG | BODY MASS INDEX: 23.22 KG/M2 | OXYGEN SATURATION: 98 % | DIASTOLIC BLOOD PRESSURE: 100 MMHG | HEART RATE: 61 BPM

## 2022-05-26 DIAGNOSIS — R79.89 OTHER SPECIFIED ABNORMAL FINDINGS OF BLOOD CHEMISTRY: ICD-10-CM

## 2022-05-26 DIAGNOSIS — R74.01 ELEVATION OF LEVELS OF LIVER TRANSAMINASE LEVELS: ICD-10-CM

## 2022-05-26 PROCEDURE — 99213 OFFICE O/P EST LOW 20 MIN: CPT

## 2022-05-26 NOTE — PHYSICAL EXAM
[No Acute Distress] : no acute distress [Well Nourished] : well nourished [Well-Appearing] : well-appearing [No Respiratory Distress] : no respiratory distress  [No Accessory Muscle Use] : no accessory muscle use [Clear to Auscultation] : lungs were clear to auscultation bilaterally [Normal Rate] : normal rate  [Regular Rhythm] : with a regular rhythm [Normal S1, S2] : normal S1 and S2 [Soft] : abdomen soft [Non Tender] : non-tender [Normal Bowel Sounds] : normal bowel sounds [No Joint Swelling] : no joint swelling [Grossly Normal Strength/Tone] : grossly normal strength/tone

## 2022-05-26 NOTE — PLAN
[FreeTextEntry1] : RECENT LAB WORK REVIEWED\par AVOIDANCE OF TYLENOL AND ALCOHOL AND RECHECK LFT'S WITH HEPATITIS PANEL\par RECHECK CBC\par TFT'S WELL CONTROLLED\par CONTINUE CURRENT DOSAGE OF SYNTHROID\par FOLLOW-UP ALL SPECIALISTS AS DIRECTED \par CALL WITH ANY QUESTIONS, CONCERNS OR CHANGES

## 2022-05-26 NOTE — HISTORY OF PRESENT ILLNESS
[FreeTextEntry1] : f/u lab work [de-identified] : MS. BAZZI IS A PLEASANT 65 YO PRESENTING FOR FOLLOW-UP OF LAB WORK.  STATES THAT SHE HAD ELEVATED LFT WITH LAB WORK DONE WITH CARDIOLOGY AFTER STATIN STARTED AND STATIN DOSAGE WAS LOWERED TWICE AND LFT'S HAVE BEEN IMPROVING.  NO ABDOMINAL PAIN OR N/V/D.  NO TOBACCO USE.  NO PRIOR ABNORMAL ELEVATED H/H.  TAKING SYNTHROID FOR HYPOTHYROIDISM.  FEELS WELL.  NO NEW COMPLAINTS TODAY.

## 2022-08-25 LAB
BASOPHILS # BLD AUTO: 0.04 K/UL
BASOPHILS NFR BLD AUTO: 0.7 %
EOSINOPHIL # BLD AUTO: 0.1 K/UL
EOSINOPHIL NFR BLD AUTO: 1.8 %
HAV IGM SER QL: NONREACTIVE
HBV CORE IGM SER QL: NONREACTIVE
HBV SURFACE AG SER QL: NONREACTIVE
HCT VFR BLD CALC: 43.6 %
HCV AB SER QL: NONREACTIVE
HCV S/CO RATIO: 0.16 S/CO
HGB BLD-MCNC: 14.2 G/DL
IMM GRANULOCYTES NFR BLD AUTO: 0.2 %
LYMPHOCYTES # BLD AUTO: 2.39 K/UL
LYMPHOCYTES NFR BLD AUTO: 43.8 %
MAN DIFF?: NORMAL
MCHC RBC-ENTMCNC: 28.1 PG
MCHC RBC-ENTMCNC: 32.6 GM/DL
MCV RBC AUTO: 86.2 FL
MONOCYTES # BLD AUTO: 0.43 K/UL
MONOCYTES NFR BLD AUTO: 7.9 %
NEUTROPHILS # BLD AUTO: 2.49 K/UL
NEUTROPHILS NFR BLD AUTO: 45.6 %
PLATELET # BLD AUTO: 240 K/UL
RBC # BLD: 5.06 M/UL
RBC # FLD: 15.9 %
WBC # FLD AUTO: 5.46 K/UL

## 2022-08-30 LAB
ALBUMIN SERPL ELPH-MCNC: 4.5 G/DL
ALP BLD-CCNC: 66 U/L
ALT SERPL-CCNC: 26 U/L
AST SERPL-CCNC: 28 U/L
BILIRUB DIRECT SERPL-MCNC: 0.1 MG/DL
BILIRUB INDIRECT SERPL-MCNC: 0.3 MG/DL
BILIRUB SERPL-MCNC: 0.4 MG/DL
PROT SERPL-MCNC: 7.1 G/DL

## 2022-11-07 ENCOUNTER — EMERGENCY (EMERGENCY)
Facility: HOSPITAL | Age: 67
LOS: 1 days | Discharge: HOME | End: 2022-11-07
Attending: EMERGENCY MEDICINE
Payer: COMMERCIAL

## 2022-11-07 VITALS
SYSTOLIC BLOOD PRESSURE: 149 MMHG | WEIGHT: 119.93 LBS | TEMPERATURE: 100 F | DIASTOLIC BLOOD PRESSURE: 99 MMHG | HEIGHT: 60 IN | RESPIRATION RATE: 18 BRPM | OXYGEN SATURATION: 98 % | HEART RATE: 95 BPM

## 2022-11-07 VITALS
OXYGEN SATURATION: 97 % | TEMPERATURE: 98 F | RESPIRATION RATE: 18 BRPM | HEART RATE: 89 BPM | SYSTOLIC BLOOD PRESSURE: 136 MMHG | DIASTOLIC BLOOD PRESSURE: 88 MMHG

## 2022-11-07 LAB
RAPID RVP RESULT: SIGNIFICANT CHANGE UP
SARS-COV-2 RNA SPEC QL NAA+PROBE: SIGNIFICANT CHANGE UP

## 2022-11-07 PROCEDURE — 0225U NFCT DS DNA&RNA 21 SARSCOV2: CPT

## 2022-11-07 PROCEDURE — 99283 EMERGENCY DEPT VISIT LOW MDM: CPT

## 2022-11-07 PROCEDURE — 99284 EMERGENCY DEPT VISIT MOD MDM: CPT

## 2022-11-07 RX ORDER — IBUPROFEN 200 MG
600 TABLET ORAL ONCE
Refills: 0 | Status: COMPLETED | OUTPATIENT
Start: 2022-11-07 | End: 2022-11-07

## 2022-11-07 RX ADMIN — Medication 600 MILLIGRAM(S): at 17:58

## 2022-11-07 NOTE — ED PROVIDER NOTE - PATIENT PORTAL LINK FT
You can access the FollowMyHealth Patient Portal offered by [facility] by registering at the following website: [email or web address]. By joining [facility]’s FollowMyHealth portal, you will also be able to view your health information using other applications (apps) compatible with our system.

## 2022-11-07 NOTE — ED PROVIDER NOTE - OBJECTIVE STATEMENT
67 yo female with hx hypothyroid, c/o 1 day of fatigue, nasal congestion and headache. patient didn't take any otc medications for symptoms today  patient spent the weekend in [place] with 2 grandkids who both have 'the flu", and slept in bed with one of the kids  denies chest pain or sob  denies abdominal pain  didn't get flu vaccine this year

## 2022-11-07 NOTE — ED PROVIDER NOTE - CARE PROVIDER_API CALL
Jesica Padron (DO)  Family Medicine  [address] [place] [address]  Phone: [phone]  Fax: [phone]  Follow Up Time:

## 2022-11-16 ENCOUNTER — APPOINTMENT (OUTPATIENT)
Dept: FAMILY MEDICINE | Facility: CLINIC | Age: 67
End: 2022-11-16

## 2022-11-16 PROCEDURE — G0008: CPT

## 2022-11-16 PROCEDURE — 90662 IIV NO PRSV INCREASED AG IM: CPT

## 2023-02-01 ENCOUNTER — APPOINTMENT (OUTPATIENT)
Dept: FAMILY MEDICINE | Facility: CLINIC | Age: 68
End: 2023-02-01
Payer: MEDICAID

## 2023-02-01 VITALS
HEART RATE: 69 BPM | HEIGHT: 61 IN | BODY MASS INDEX: 22.28 KG/M2 | DIASTOLIC BLOOD PRESSURE: 88 MMHG | SYSTOLIC BLOOD PRESSURE: 138 MMHG | WEIGHT: 118 LBS | OXYGEN SATURATION: 97 %

## 2023-02-01 PROCEDURE — 99213 OFFICE O/P EST LOW 20 MIN: CPT

## 2023-02-01 RX ORDER — ROSUVASTATIN CALCIUM 20 MG/1
20 TABLET, FILM COATED ORAL
Refills: 0 | Status: DISCONTINUED | COMMUNITY
End: 2023-02-01

## 2023-02-01 NOTE — HISTORY OF PRESENT ILLNESS
[FreeTextEntry1] : lab work, cholesterol [de-identified] : MS. BAZZI IS A PLEASANT 67 YO PRESENTING FOR FOLLOW-UP.  CHRONIC HISTORY OF HYPOTHYROIDISM, VITAMIN D INSUFFICIENCY AND HYPERLIPIDEMIA.  IS NO LONGER ON CRESTOR FOR CHOLESTEROL - STOPPED BY CARDIOLOGY.  IS TAKING SYNTHROID FOR THYROID PER ENDOCRINOLOGY.  SAW CARDIOLOGY AND HAD A STRESS TEST LAST YEAR.  DID WELL.  SEEING CARDIOLOGY AGAIN NEXT MONTH.  HAD COLONOSCOPY.  HAS UPCOMING APPOINTMENT FOR NEXT MAMMOGRAM.  SEES GYN ROUTINELY.  IS EXERCISING. DOES [employer] [facility].  WALKING ONE HOUR DAILY.

## 2023-02-01 NOTE — PLAN
[FreeTextEntry1] : DOES NOT WANT PNEUMONIA VACCINATION TODAY\par WILL GET UPDATED LIPIDS AND LAB WORK\par REVIEWED PRIOR TESTING\par HEALTHY DIET AND LIFESTYLE MODIFICATIONS\par FOLLOW-UP ALL SPECIALISTS AS DIRECTED \par CALL WITH ANY QUESTIONS, CONCERNS OR CHANGES

## 2023-02-14 LAB
ALBUMIN SERPL ELPH-MCNC: 4.3 G/DL
ALP BLD-CCNC: 77 U/L
ALT SERPL-CCNC: 23 U/L
ANION GAP SERPL CALC-SCNC: 11 MMOL/L
AST SERPL-CCNC: 20 U/L
BILIRUB SERPL-MCNC: 0.4 MG/DL
BUN SERPL-MCNC: 14 MG/DL
CALCIUM SERPL-MCNC: 8.5 MG/DL
CHLORIDE SERPL-SCNC: 100 MMOL/L
CHOLEST SERPL-MCNC: 210 MG/DL
CO2 SERPL-SCNC: 28 MMOL/L
CREAT SERPL-MCNC: 0.75 MG/DL
EGFR: 88 ML/MIN/1.73M2
ESTIMATED AVERAGE GLUCOSE: 111 MG/DL
GLUCOSE SERPL-MCNC: 95 MG/DL
HBA1C MFR BLD HPLC: 5.5 %
HDLC SERPL-MCNC: 50 MG/DL
LDLC SERPL CALC-MCNC: 143 MG/DL
NONHDLC SERPL-MCNC: 160 MG/DL
POTASSIUM SERPL-SCNC: 4.5 MMOL/L
PROT SERPL-MCNC: 7.5 G/DL
SODIUM SERPL-SCNC: 139 MMOL/L
TRIGL SERPL-MCNC: 83 MG/DL

## 2023-03-17 ENCOUNTER — NON-APPOINTMENT (OUTPATIENT)
Age: 68
End: 2023-03-17

## 2023-03-23 ENCOUNTER — APPOINTMENT (OUTPATIENT)
Dept: FAMILY MEDICINE | Facility: CLINIC | Age: 68
End: 2023-03-23
Payer: MEDICARE

## 2023-03-23 VITALS
HEIGHT: 61 IN | DIASTOLIC BLOOD PRESSURE: 76 MMHG | OXYGEN SATURATION: 98 % | HEART RATE: 74 BPM | SYSTOLIC BLOOD PRESSURE: 120 MMHG | BODY MASS INDEX: 21.9 KG/M2 | WEIGHT: 116 LBS

## 2023-03-23 DIAGNOSIS — M79.10 MYALGIA, UNSPECIFIED SITE: ICD-10-CM

## 2023-03-23 PROCEDURE — 36415 COLL VENOUS BLD VENIPUNCTURE: CPT

## 2023-03-23 PROCEDURE — 99213 OFFICE O/P EST LOW 20 MIN: CPT | Mod: 25

## 2023-03-27 LAB
ALBUMIN SERPL ELPH-MCNC: 4.4 G/DL
ALP BLD-CCNC: 84 U/L
ALT SERPL-CCNC: 34 U/L
ANION GAP SERPL CALC-SCNC: 15 MMOL/L
AST SERPL-CCNC: 36 U/L
BILIRUB SERPL-MCNC: 0.4 MG/DL
BUN SERPL-MCNC: 17 MG/DL
CALCIUM SERPL-MCNC: 8.7 MG/DL
CHLORIDE SERPL-SCNC: 102 MMOL/L
CK SERPL-CCNC: 132 U/L
CO2 SERPL-SCNC: 24 MMOL/L
CREAT SERPL-MCNC: 0.71 MG/DL
EGFR: 94 ML/MIN/1.73M2
GLUCOSE SERPL-MCNC: 83 MG/DL
MAGNESIUM SERPL-MCNC: 1.9 MG/DL
POTASSIUM SERPL-SCNC: 4.7 MMOL/L
PROT SERPL-MCNC: 7.5 G/DL
SODIUM SERPL-SCNC: 141 MMOL/L

## 2023-04-04 ENCOUNTER — APPOINTMENT (OUTPATIENT)
Dept: VASCULAR SURGERY | Facility: CLINIC | Age: 68
End: 2023-04-04
Payer: MEDICARE

## 2023-04-04 VITALS
TEMPERATURE: 98.1 F | DIASTOLIC BLOOD PRESSURE: 68 MMHG | HEIGHT: 58 IN | OXYGEN SATURATION: 97 % | HEART RATE: 64 BPM | SYSTOLIC BLOOD PRESSURE: 119 MMHG | BODY MASS INDEX: 25.19 KG/M2 | WEIGHT: 120 LBS | RESPIRATION RATE: 16 BRPM

## 2023-04-04 DIAGNOSIS — M54.10 RADICULOPATHY, SITE UNSPECIFIED: ICD-10-CM

## 2023-04-04 PROCEDURE — 99203 OFFICE O/P NEW LOW 30 MIN: CPT

## 2023-04-04 NOTE — PHYSICAL EXAM
[Respiratory Effort] : normal respiratory effort [2+] : left 2+ [de-identified] : Appears well in no acute distress [FreeTextEntry1] : Some pain elicited in the back of the popliteal fossa when attempting to palpate the popliteal pulse. [de-identified] : Bilateral lower extremities with no significant varicosities there is slight asymmetry with the left leg being larger than the right.

## 2023-04-04 NOTE — ASSESSMENT
[FreeTextEntry1] : 67-year-old female with bilateral posterior knee pain mainly when getting from a seated to a standing position.  She has no evidence of arterial or venous insufficiency on exam.  She is scheduled to see orthopedics tomorrow.  She has no vascular needs at this time.

## 2023-04-04 NOTE — HISTORY OF PRESENT ILLNESS
[FreeTextEntry1] : Number 67-year-old female relatively healthy and active.  She does have a history of possible familial hypercholesterolemia.  In August she was started on statin medication.  She experienced pain mainly behind both knees and distal thighs.  Her medication was stopped but she continued to have the symptoms.  She was sent for exercise therapy which did improve the pain.  She reports that her pain is mainly when going from a sitting to a standing position she experiences significant pain behind the knees and distal thighs.  She denies any rest pain she denies claudication symptoms.  She is a non-smoker.

## 2023-04-05 ENCOUNTER — APPOINTMENT (OUTPATIENT)
Dept: ORTHOPEDIC SURGERY | Facility: CLINIC | Age: 68
End: 2023-04-05
Payer: COMMERCIAL

## 2023-04-05 VITALS
HEIGHT: 58 IN | HEART RATE: 58 BPM | WEIGHT: 120 LBS | SYSTOLIC BLOOD PRESSURE: 135 MMHG | DIASTOLIC BLOOD PRESSURE: 81 MMHG | BODY MASS INDEX: 25.19 KG/M2

## 2023-04-05 DIAGNOSIS — M25.561 PAIN IN RIGHT KNEE: ICD-10-CM

## 2023-04-05 DIAGNOSIS — M25.562 PAIN IN RIGHT KNEE: ICD-10-CM

## 2023-04-05 PROCEDURE — 99204 OFFICE O/P NEW MOD 45 MIN: CPT | Mod: 25

## 2023-04-05 PROCEDURE — 20610 DRAIN/INJ JOINT/BURSA W/O US: CPT | Mod: 50

## 2023-04-05 PROCEDURE — 73564 X-RAY EXAM KNEE 4 OR MORE: CPT | Mod: 50

## 2023-04-05 NOTE — PHYSICAL EXAM
[de-identified] : GENERAL APPEARANCE: Well nourished and hydrated, pleasant, alert, and oriented x 3. Appears their stated age. \par HEENT: Normocephalic, extraocular eye motion intact. Nasal septum midline. Oral cavity clear. External auditory canal clear. \par RESPIRATORY: Breath sounds clear and audible in all lobes. No wheezing, No accessory muscle use.\par CARDIOVASCULAR: No apparent abnormalities. No lower leg edema. No varicosities. Pedal pulses are palpable.\par NEUROLOGIC: Sensation is normal, no muscle weakness in the upper or lower extremities.\par DERMATOLOGIC: No apparent skin lesions, moist, warm, no rash.\par SPINE: Cervical spine appears normal and moves freely; thoracic spine appears normal and moves freely; lumbosacral spine appears normal and moves freely, normal, nontender.\par MUSCULOSKELETAL: Hands, wrists, and elbows are normal and move freely, shoulders are normal and move freely. \par Psychiatric: Oriented to person, place, and time, insight and judgement were intact and the affect was normal. \par Musculoskeletal:. Left knee exam shows mild effusion, ROM is 0-1 10 degrees, no instability, no pain with Fatoumata, and medial joint line tenderness. \par Right knee exam shows mild effusion, ROM is 0-1 15 degrees, no instability, no pain with Fatoumata, medial joint line tenderness. \par 5/5 motor strength in bilateral lower extremities. Sensory: Intact in bilateral lower extremities. DTRs: Biceps, brachioradialis, triceps, patellar, ankle and plantar 2+ and symmetric bilaterally. Pulses: dorsalis pedis, posterior tibial, femoral, popliteal, and radial 2+ and symmetric bilaterally. \par  [de-identified] : 4 views of bilateral knees obtained the office today show no acute fracture or dislocation.  There is mild medial joint space narrowing in the right knee small osteophyte formation consistent Kellgren-Jesus grade 1 2 changes.  For the left knee there is mild medial joint space narrowing consistent with Kellgren-Jesus grade 1 changes

## 2023-04-05 NOTE — DISCUSSION/SUMMARY
[Medication Risks Reviewed] : Medication risks reviewed [Surgical risks reviewed] : Surgical risks reviewed [de-identified] : Patient is a 67-year-old female here today for initial evaluation of bilateral knee pain.  She does have significant pain in the posterior aspect of her knee especially with flexion and arising reseated position going better activities of daily living.  Due to the fact that she has some mild arthritis in her knees and significant pain I do think conservative treatment is warranted at this time.  I gave her injection of cortisone to the bilateral knees which she tolerated well.  I recommended physical therapy.  I have given her prescription for meloxicam 15 mg daily as needed for pain.  I will see her back in 6 weeks for repeat evaluation.  All questions were asked and answered

## 2023-04-05 NOTE — PROCEDURE
[de-identified] : I injected his right knee.\par I discussed at length with the patient the planned steroid and lidocaine injection. The risks, benefits, convalescence and alternatives were reviewed. The possible side effects discussed included but were not limited to: pain, swelling, heat, bleeding, and redness. Symptoms are generally mild but if they are extensive then contact the office. Giving pain relievers by mouth such as NSAIDs or Tylenol can generally treat the reactions to steroid and lidocaine. Rare cases of infection have been noted. Rash, hives and itching may occur post injection. If you have muscle pain or cramps, flushing and or swelling of the face, rapid heart beat, nausea, dizziness, fever, chills, headache, difficulty breathing, swelling in the arms or legs, or have a prickly feeling of your skin, contact a health care provider immediately. Following this discussion, the knee was prepped with Alcohol and under sterile condition the 80 mg Depo-Medrol and 6 cc Lidocaine injection was performed with a 20 gauge needle through a superolateral injection site. The needle was introduced into the joint, aspiration was performed to ensure intra-articular placement and the medication was injected. Upon withdrawal of the needle the site was cleaned with alcohol and a band aid applied. The patient tolerated the injection well and there were no adverse effects. Post injection instructions included no strenuous activity for 24 hours, cryotherapy and if there are any adverse effects to contact the office.\par I injected his left knee.\par I discussed at length with the patient the planned steroid and lidocaine injection. The risks, benefits, convalescence and alternatives were reviewed. The possible side effects discussed included but were not limited to: pain, swelling, heat, bleeding, and redness. Symptoms are generally mild but if they are extensive then contact the office. Giving pain relievers by mouth such as NSAIDs or Tylenol can generally treat the reactions to steroid and lidocaine. Rare cases of infection have been noted. Rash, hives and itching may occur post injection. If you have muscle pain or cramps, flushing and or swelling of the face, rapid heart beat, nausea, dizziness, fever, chills, headache, difficulty breathing, swelling in the arms or legs, or have a prickly feeling of your skin, contact a health care provider immediately. Following this discussion, the knee was prepped with Alcohol and under sterile condition the 80 mg Depo-Medrol and 6 cc Lidocaine injection was performed with a 20 gauge needle through a superolateral injection site. The needle was introduced into the joint, aspiration was performed to ensure intra-articular placement and the medication was injected. Upon withdrawal of the needle the site was cleaned with alcohol and a band aid applied. The patient tolerated the injection well and there were no adverse effects. Post injection instructions included no strenuous activity for 24 hours, cryotherapy and if there are any adverse effects to contact the office.\par

## 2023-04-05 NOTE — HISTORY OF PRESENT ILLNESS
[Worsening] : worsening [Intermit.] : ~He/She~ states the symptoms seem to be intermittent [Bending] : worsened by bending [Sitting] : worsened by sitting [Knee Flexion] : worsened with knee flexion [Exercise Regimen] : relieved by exercise regimen [Rest] : relieved by rest [de-identified] : 67 year old female with past medical history of thyriod cancer s/p thryoidectomy (2009) and HLD presents to office for initial evaluation of bilateral posterior knee pain that has been ongoing for the past 6 months.  Patient states the pain is present anytime she flexes her knees, such as with bending, navigating stairs, putting on her pants shoes and socks, getting out of a car, sitting.  She states that the pain originates in the posterior portion of her buttock radiates down her posterior thigh is most intense behind her knees and down her calf to her ankle.  She does not take any medication for the pain as she states that it is not a constant pain and she is able to modify her activity enough to avoid exacerbating the pain.  Denies use of assistive ice ambulation, has not been to physical therapy but does participate in balance and Pilates classes weekly.  Denies a history of injections or surgeries in the knees.  Denies any recent injuries falls or trauma, locking catching or buckling of the knees, numbness/tingling, radiation of pain to the hip or back, swelling.\par \par Denies history of smoking drug or alcohol use.

## 2023-04-07 PROBLEM — M79.10 MUSCLE ACHE: Status: ACTIVE | Noted: 2023-03-23

## 2023-04-07 NOTE — PLAN
[FreeTextEntry1] : AVOID HEAVY LIFTING OR ACTIVITIES CAUSING PAIN\par LIGHT STRETCHING\par WILL CHECK LAB WORK INCLUDING CPK\par HOLD STATIN AT THIS TIME TO SEE IF RELATED TO SYMPTOMS\par PLANS TO SEE ORTHOPEDICS\par REMAIN HYDRATED\par CALL WITH ANY QUESTIONS, CONCERNS OR CHANGES

## 2023-04-07 NOTE — HISTORY OF PRESENT ILLNESS
[FreeTextEntry1] : ACHY [de-identified] : MS. BAZZI IS A PLEASANT 66 YO PRESENTING FOR FOLLOW-UP.  IS GETTING MUSCLE ACHE.  STARTED IN SEPTEMBER.  STARTED AFTER SHE STARTED CHOLESTEROL MEDICATION BY HER CARDIOLOGIST.  JUST STOPPED TAKING CHOLESTEROL MEDICATION ON MARCH 18TH.  CARDIOLOGIST WANTS HER TO SEE ORTHOPEDICS.  IS BETTER WITH EXERCISE CLASSES.  NO BACK PAIN.  WATCHING DIET AND REMAINING ACTIVE.  NO FALLS.  NO SWELLING.

## 2023-05-01 ENCOUNTER — APPOINTMENT (OUTPATIENT)
Dept: ORTHOPEDIC SURGERY | Facility: CLINIC | Age: 68
End: 2023-05-01

## 2023-06-14 ENCOUNTER — APPOINTMENT (OUTPATIENT)
Dept: ORTHOPEDIC SURGERY | Facility: CLINIC | Age: 68
End: 2023-06-14
Payer: MEDICARE

## 2023-06-14 VITALS
HEIGHT: 58 IN | SYSTOLIC BLOOD PRESSURE: 128 MMHG | BODY MASS INDEX: 25.19 KG/M2 | WEIGHT: 120 LBS | DIASTOLIC BLOOD PRESSURE: 76 MMHG | HEART RATE: 61 BPM

## 2023-06-14 PROCEDURE — 99213 OFFICE O/P EST LOW 20 MIN: CPT

## 2023-06-14 NOTE — DISCUSSION/SUMMARY
[Medication Risks Reviewed] : Medication risks reviewed [Surgical risks reviewed] : Surgical risks reviewed [PRN] : PRN [de-identified] : Patient is a 67-year-old female here today for follow-up of her bilateral knee pain.  She has had excellent response to cortisone injections from her last visit.  She is currently pain-free.  I recommend she continue with low impact activity and exercises.  She will continue take Tylenol and NSAIDs as needed for any pain.  I will see her back on an as-needed basis for her bilateral knees.  All questions were asked and answered.

## 2023-06-14 NOTE — ADDENDUM
[FreeTextEntry1] : This note was written by Jina Rome, acting as the  for Dr. Willis. This note accurately reflects the work and decisions made by Dr. Willis.

## 2023-06-14 NOTE — PHYSICAL EXAM
[de-identified] : Left knee exam shows no effusion, ROM is 0-125 degrees, no instability, no pain with Fatoumata, and no medial joint line tenderness. \par Right knee exam shows no effusion, ROM is 0-125 degrees, no instability, no pain with Fatoumata, no medial joint line tenderness. \par 5/5 motor strength in bilateral lower extremities. Sensory: Intact in bilateral lower extremities. DTRs: Biceps, brachioradialis, triceps, patellar, ankle and plantar 2+ and symmetric bilaterally. Pulses: dorsalis pedis, posterior tibial, femoral, popliteal, and radial 2+ and symmetric bilaterally.

## 2023-06-14 NOTE — HISTORY OF PRESENT ILLNESS
[Intermit.] : ~He/She~ states the symptoms seem to be intermittent [Sitting] : worsened by sitting [Exercise Regimen] : relieved by exercise regimen [Rest] : relieved by rest [Pain Location] : pain [] : right & left knee [Improving] : improving [___ mths] : [unfilled] month(s) ago [de-identified] : 67 year old female with past medical history of thyroid cancer s/p thyroidectomy (2009) and HLD presents to office for her 10 week follow-up of bilateral posterior knee pain that has been ongoing for the past 8 months. Patient states she tried PT, but found it offered no relief. She states her pain has improved post injection.  Overall is very happy with her progress.  Is going to the gym and performing her activities of daily living.  Denies any pain in her knees [Physical Therapy] : not relieved by physical therapy

## 2023-08-09 NOTE — ED STATDOCS - NSCAREINITIATED _GEN_ER
Carlos Chaney(Attending) Ivermectin Counseling:  Patient instructed to take medication on an empty stomach with a full glass of water.  Patient informed of potential adverse effects including but not limited to nausea, diarrhea, dizziness, itching, and swelling of the extremities or lymph nodes.  The patient verbalized understanding of the proper use and possible adverse effects of ivermectin.  All of the patient's questions and concerns were addressed.

## 2023-08-15 ENCOUNTER — EMERGENCY (EMERGENCY)
Facility: HOSPITAL | Age: 68
LOS: 1 days | Discharge: HOME | End: 2023-08-15
Attending: EMERGENCY MEDICINE
Payer: COMMERCIAL

## 2023-08-15 VITALS
HEIGHT: 61 IN | DIASTOLIC BLOOD PRESSURE: 80 MMHG | WEIGHT: 110.01 LBS | SYSTOLIC BLOOD PRESSURE: 123 MMHG | HEART RATE: 69 BPM | TEMPERATURE: 99 F | OXYGEN SATURATION: 98 % | RESPIRATION RATE: 18 BRPM

## 2023-08-15 PROCEDURE — 99283 EMERGENCY DEPT VISIT LOW MDM: CPT

## 2023-08-15 PROCEDURE — 99284 EMERGENCY DEPT VISIT MOD MDM: CPT

## 2023-08-15 RX ORDER — IBUPROFEN 200 MG
600 TABLET ORAL ONCE
Refills: 0 | Status: COMPLETED | OUTPATIENT
Start: 2023-08-15 | End: 2023-08-15

## 2023-08-15 RX ADMIN — Medication 50 MILLIGRAM(S): at 13:03

## 2023-08-15 RX ADMIN — Medication 600 MILLIGRAM(S): at 12:32

## 2023-08-15 NOTE — ED PROVIDER NOTE - OBJECTIVE STATEMENT
67-year-old Female with a PMHx of sciatica, hypothyroidism, and osteoarthritis in her knees came in presenting with right lateral hip pain that began on Sunday. Pain is described as sharp pain that radiates down thigh and into her posterior leg. States that she was moving around a lot of boxes and furniture prior to the gradual onset of pain. Pain is worse at night and when rising from a seated position. Denies any fever, back pain, numbness or tingling, weakness of lower extremities, pain on contralateral side, or urinary or fecal incontinence.

## 2023-08-15 NOTE — ED PROVIDER NOTE - PATIENT PORTAL LINK FT
You can access the FollowMyHealth Patient Portal offered by [facility] by registering at the following website: [email or web address]. By joining [email or web address]’s FollowMyHealth portal, you will also be able to view your health information using other applications (apps) compatible with our system.

## 2023-08-15 NOTE — ED PROVIDER NOTE - CARE PROVIDER_API CALL
Wilbert Levi  Pain Medicine  [address] [place] [address], [place] [address]  Phone: [phone]  Fax: [phone]  Follow Up Time:

## 2023-08-15 NOTE — ED ADULT NURSE NOTE - NSFALLUNIVINTERV_ED_ALL_ED
Bed/Stretcher in lowest position, wheels locked, appropriate side rails in place/Call bell, personal items and telephone in reach/Instruct patient to call for assistance before getting out of bed/chair/stretcher/Non-slip footwear applied when patient is off stretcher/[place] to call system/Physically safe environment - no spills, clutter or unnecessary equipment/Purposeful proactive rounding/Room/bathroom lighting operational, light cord in reach

## 2023-09-07 ENCOUNTER — APPOINTMENT (OUTPATIENT)
Dept: ORTHOPEDIC SURGERY | Facility: CLINIC | Age: 68
End: 2023-09-07
Payer: MEDICARE

## 2023-09-07 VITALS
HEART RATE: 62 BPM | DIASTOLIC BLOOD PRESSURE: 72 MMHG | SYSTOLIC BLOOD PRESSURE: 119 MMHG | WEIGHT: 120 LBS | HEIGHT: 58 IN | BODY MASS INDEX: 25.19 KG/M2

## 2023-09-07 DIAGNOSIS — M17.0 BILATERAL PRIMARY OSTEOARTHRITIS OF KNEE: ICD-10-CM

## 2023-09-07 PROCEDURE — 99213 OFFICE O/P EST LOW 20 MIN: CPT

## 2023-09-07 RX ORDER — DICLOFENAC SODIUM 1% 10 MG/G
1 GEL TOPICAL DAILY
Qty: 1 | Refills: 1 | Status: ACTIVE | COMMUNITY
Start: 2023-09-07 | End: 1900-01-01

## 2023-09-07 NOTE — DISCUSSION/SUMMARY
[Medication Risks Reviewed] : Medication risks reviewed [Surgical risks reviewed] : Surgical risks reviewed [PRN] : PRN [de-identified] : Patient is a 67-year-old female here today for follow-up of her bilateral knee pain.  At this time she is only experiencing very minor pain.  The sciatic pain that she had had resolved with a Medrol Dosepak that was given by this outside institution.  I have therefore recommend she continue with conservative treatment at this time.  I given her prescription for diclofenac gel.  We discussed low impact activity and exercises.  She will continue take Tylenol as needed for the pain.  I will see her back on an as-needed basis for her bilateral knees.  All questions were asked and answered

## 2023-09-07 NOTE — PHYSICAL EXAM
[de-identified] : Left knee exam shows no effusion, ROM is 0-125 degrees, no instability, no pain with Fatoumata, and no medial joint line tenderness.  Right knee exam shows no effusion, ROM is 0-125 degrees, no instability, no pain with Fatoumata, no medial joint line tenderness.  5/5 motor strength in bilateral lower extremities. Sensory: Intact in bilateral lower extremities. DTRs: Biceps, brachioradialis, triceps, patellar, ankle and plantar 2+ and symmetric bilaterally. Pulses: dorsalis pedis, posterior tibial, femoral, popliteal, and radial 2+ and symmetric bilaterally.

## 2023-09-07 NOTE — HISTORY OF PRESENT ILLNESS
[Pain Location] : pain [] : right & left knee [Stable] : stable [___ yrs] : [unfilled] year(s) ago [Intermit.] : ~He/She~ states the symptoms seem to be intermittent [Sitting] : worsened by sitting [Exercise Regimen] : relieved by exercise regimen [Rest] : relieved by rest [de-identified] : 67-year-old female with past medical history of thyroid cancer s/p thyroidectomy (2009) and HLD presents to office for her 12 week follow-up of bilateral posterior knee pain that has been ongoing for the past 12 months. The patient stated in her visit on today that that the cortisone shot was helpful, this was administered 04/05/23.  Patient states that she recently developed sciatica and was treated at an outside hospital for this and then began to develop some minor increasing pain in the knees.  States that currently the knees only bother her a little bit.  Mostly over the medial aspect of the knee.  Denies any falls or trauma.  Denies any overt neurovascular [Physical Therapy] : not relieved by physical therapy

## 2024-01-22 ENCOUNTER — APPOINTMENT (OUTPATIENT)
Dept: ENDOCRINOLOGY | Facility: CLINIC | Age: 69
End: 2024-01-22
Payer: MEDICARE

## 2024-01-22 ENCOUNTER — LABORATORY RESULT (OUTPATIENT)
Age: 69
End: 2024-01-22

## 2024-01-22 VITALS
HEART RATE: 69 BPM | DIASTOLIC BLOOD PRESSURE: 70 MMHG | SYSTOLIC BLOOD PRESSURE: 116 MMHG | HEIGHT: 58 IN | OXYGEN SATURATION: 96 % | BODY MASS INDEX: 23.51 KG/M2 | WEIGHT: 112 LBS

## 2024-01-22 DIAGNOSIS — C73 MALIGNANT NEOPLASM OF THYROID GLAND: ICD-10-CM

## 2024-01-22 DIAGNOSIS — E20.9 HYPOPARATHYROIDISM, UNSPECIFIED: ICD-10-CM

## 2024-01-22 PROCEDURE — G2211 COMPLEX E/M VISIT ADD ON: CPT

## 2024-01-22 PROCEDURE — 36415 COLL VENOUS BLD VENIPUNCTURE: CPT

## 2024-01-22 PROCEDURE — 99204 OFFICE O/P NEW MOD 45 MIN: CPT | Mod: 25

## 2024-01-22 RX ORDER — ROSUVASTATIN CALCIUM 10 MG/1
10 TABLET, FILM COATED ORAL
Refills: 0 | Status: DISCONTINUED | COMMUNITY
End: 2024-01-22

## 2024-01-22 RX ORDER — CALCITRIOL 0.25 UG/1
0.25 CAPSULE, LIQUID FILLED ORAL
Qty: 180 | Refills: 1 | Status: ACTIVE | COMMUNITY
Start: 1900-01-01 | End: 1900-01-01

## 2024-01-22 RX ORDER — EZETIMIBE 10 MG/1
10 TABLET ORAL
Refills: 0 | Status: DISCONTINUED | COMMUNITY
End: 2024-01-22

## 2024-01-23 LAB
ANION GAP SERPL CALC-SCNC: 11 MMOL/L
BUN SERPL-MCNC: 21 MG/DL
CALCIT SERPL-MCNC: <1 PG/ML
CALCIUM SERPL-MCNC: 8.6 MG/DL
CHLORIDE SERPL-SCNC: 99 MMOL/L
CO2 SERPL-SCNC: 24 MMOL/L
CREAT SERPL-MCNC: 0.58 MG/DL
EGFR: 99 ML/MIN/1.73M2
GLUCOSE SERPL-MCNC: 77 MG/DL
PHOSPHATE SERPL-MCNC: 4.3 MG/DL
POTASSIUM SERPL-SCNC: 5 MMOL/L
SODIUM SERPL-SCNC: 134 MMOL/L
T4 SERPL-MCNC: 10.3 UG/DL
TSH SERPL-ACNC: 1.51 UIU/ML

## 2024-01-28 NOTE — HISTORY OF PRESENT ILLNESS
[FreeTextEntry1] : Jan 22, 2024     in person                    5/4/22 A1c 5.8%  needs ELI Synthroid  112   calcitriol 0.25 BID   calcium 600  [place] [facility]   [place]. Cr navarrete I-131     she will bring pathology    Her records in storage  PCP:  Dr. Tyra Wagner c/o [facility]           Surgeon - Dr. Hankins  CC:  History of thyroid cancer ~ 14 years ago - [place] [facility] [place] Hankins - no I-131   1/8/2023 US neck: reassuring          Possible hypoparathyroidism - takes calcitriol and calcium        5/2022 25 OH vit D 41  (Dr. Rula Ferraro)               12/16/2022 [facility]  T scores:  LS -0.9;  TH 0.8  FN 0.9  1/3 FA  0   67 yo recently moved to this area from [place]. Reports thyroid cancer 14 years ago - [place] [facility] [place]. Hankins. Her records are in storage.  : : Constitutional:  Alert, well nourished, healthy appearance, no acute distress  Eyes:  No proptosis, no stare Thyroid:  No palpable tissue Pulmonary:  No respiratory distress, no accessory muscle use; normal rate and effort Cardiac:  Normal rate Vascular:  Endocrine:  No stigmata of Cushings Syndrome Musculoskeletal:  Normal gait, no involuntary movements Neurology:  No tremors Affect/Mood/Psych:  Oriented x 3; normal affect, normal insight/judgement, normal mood  . Impression:  Hypothyroid on Synthroid 112 mcg daily. Plan:  Review pathology report when available. Updated labs today. JANET August.

## 2024-02-02 ENCOUNTER — APPOINTMENT (OUTPATIENT)
Dept: CARDIOLOGY | Facility: CLINIC | Age: 69
End: 2024-02-02
Payer: MEDICARE

## 2024-02-02 ENCOUNTER — NON-APPOINTMENT (OUTPATIENT)
Age: 69
End: 2024-02-02

## 2024-02-02 VITALS — SYSTOLIC BLOOD PRESSURE: 149 MMHG | DIASTOLIC BLOOD PRESSURE: 79 MMHG

## 2024-02-02 VITALS
WEIGHT: 111 LBS | BODY MASS INDEX: 23.3 KG/M2 | HEIGHT: 58 IN | OXYGEN SATURATION: 96 % | HEART RATE: 70 BPM | SYSTOLIC BLOOD PRESSURE: 138 MMHG | DIASTOLIC BLOOD PRESSURE: 84 MMHG

## 2024-02-02 DIAGNOSIS — I51.89 OTHER ILL-DEFINED HEART DISEASES: ICD-10-CM

## 2024-02-02 DIAGNOSIS — M77.9 ENTHESOPATHY, UNSPECIFIED: ICD-10-CM

## 2024-02-02 DIAGNOSIS — E03.9 HYPOTHYROIDISM, UNSPECIFIED: ICD-10-CM

## 2024-02-02 DIAGNOSIS — Z63.4 DISAPPEARANCE AND DEATH OF FAMILY MEMBER: ICD-10-CM

## 2024-02-02 PROCEDURE — 93000 ELECTROCARDIOGRAM COMPLETE: CPT

## 2024-02-02 PROCEDURE — 99204 OFFICE O/P NEW MOD 45 MIN: CPT | Mod: 25

## 2024-02-02 RX ORDER — B-COMPLEX WITH VITAMIN C
TABLET ORAL
Refills: 0 | Status: ACTIVE | COMMUNITY

## 2024-02-02 SDOH — SOCIAL STABILITY - SOCIAL INSECURITY: DISSAPEARANCE AND DEATH OF FAMILY MEMBER: Z63.4

## 2024-02-02 NOTE — REVIEW OF SYSTEMS
Telephoned patient again as f/u on status post surgery and RX form UTI  Etc  Left a VM to return call need status update  [Snoring] : snoring [Negative] : Heme/Lymph

## 2024-02-04 VITALS — SYSTOLIC BLOOD PRESSURE: 120 MMHG | DIASTOLIC BLOOD PRESSURE: 68 MMHG

## 2024-02-04 LAB
THYROGLOB AB SERPL IA-ACNC: <1.8 IU/ML
THYROPEROXIDASE AB SERPL IA-ACNC: 47.9 IU/ML

## 2024-02-05 ENCOUNTER — NON-APPOINTMENT (OUTPATIENT)
Age: 69
End: 2024-02-05

## 2024-03-01 ENCOUNTER — APPOINTMENT (OUTPATIENT)
Dept: CARDIOLOGY | Facility: CLINIC | Age: 69
End: 2024-03-01
Payer: MEDICARE

## 2024-03-01 PROCEDURE — 93306 TTE W/DOPPLER COMPLETE: CPT

## 2024-03-26 DIAGNOSIS — Z92.89 PERSONAL HISTORY OF OTHER MEDICAL TREATMENT: ICD-10-CM

## 2024-04-23 ENCOUNTER — APPOINTMENT (OUTPATIENT)
Dept: CARDIOLOGY | Facility: CLINIC | Age: 69
End: 2024-04-23

## 2024-05-07 ENCOUNTER — APPOINTMENT (OUTPATIENT)
Dept: CARDIOLOGY | Facility: CLINIC | Age: 69
End: 2024-05-07
Payer: MEDICARE

## 2024-05-07 VITALS
SYSTOLIC BLOOD PRESSURE: 135 MMHG | OXYGEN SATURATION: 98 % | DIASTOLIC BLOOD PRESSURE: 66 MMHG | HEART RATE: 69 BPM | BODY MASS INDEX: 23.3 KG/M2 | WEIGHT: 111 LBS | HEIGHT: 58 IN

## 2024-05-07 DIAGNOSIS — R93.1 ABNORMAL FINDINGS ON DIAGNOSTIC IMAGING OF HEART AND CORONARY CIRCULATION: ICD-10-CM

## 2024-05-07 PROCEDURE — 99214 OFFICE O/P EST MOD 30 MIN: CPT

## 2024-05-08 ENCOUNTER — APPOINTMENT (OUTPATIENT)
Dept: OBGYN | Facility: CLINIC | Age: 69
End: 2024-05-08

## 2024-05-09 NOTE — HISTORY OF PRESENT ILLNESS
[FreeTextEntry1] : Doing ok   On 2/3/24, started atorvastatin 40 and zetia 10  -. until 4/30/24 -. then started atorvastatin 5 on 5/1/24 Had labs on  5/1/24 -. LDL better (49) but LFTs went up (AST/ALT 55/82) -. yesterday -> old to stop both meds  Has some joint/muscle pain Walks twice a day for 1 hr each

## 2024-05-24 ENCOUNTER — APPOINTMENT (OUTPATIENT)
Dept: CARDIOLOGY | Facility: CLINIC | Age: 69
End: 2024-05-24

## 2024-06-10 ENCOUNTER — APPOINTMENT (OUTPATIENT)
Dept: CARDIOLOGY | Facility: CLINIC | Age: 69
End: 2024-06-10
Payer: MEDICARE

## 2024-06-10 PROCEDURE — 36415 COLL VENOUS BLD VENIPUNCTURE: CPT

## 2024-06-14 ENCOUNTER — APPOINTMENT (OUTPATIENT)
Dept: OBGYN | Facility: CLINIC | Age: 69
End: 2024-06-14
Payer: MEDICARE

## 2024-06-14 ENCOUNTER — APPOINTMENT (OUTPATIENT)
Dept: CARDIOLOGY | Facility: CLINIC | Age: 69
End: 2024-06-14
Payer: MEDICARE

## 2024-06-14 VITALS
BODY MASS INDEX: 22.67 KG/M2 | HEART RATE: 43 BPM | DIASTOLIC BLOOD PRESSURE: 65 MMHG | SYSTOLIC BLOOD PRESSURE: 126 MMHG | OXYGEN SATURATION: 95 % | WEIGHT: 108 LBS | HEIGHT: 58 IN

## 2024-06-14 VITALS
HEIGHT: 58 IN | DIASTOLIC BLOOD PRESSURE: 60 MMHG | WEIGHT: 108 LBS | SYSTOLIC BLOOD PRESSURE: 100 MMHG | BODY MASS INDEX: 22.67 KG/M2

## 2024-06-14 DIAGNOSIS — I38 ENDOCARDITIS, VALVE UNSPECIFIED: ICD-10-CM

## 2024-06-14 DIAGNOSIS — Z12.31 ENCOUNTER FOR SCREENING MAMMOGRAM FOR MALIGNANT NEOPLASM OF BREAST: ICD-10-CM

## 2024-06-14 DIAGNOSIS — R73.03 PREDIABETES.: ICD-10-CM

## 2024-06-14 DIAGNOSIS — I65.29 OCCLUSION AND STENOSIS OF UNSPECIFIED CAROTID ARTERY: ICD-10-CM

## 2024-06-14 DIAGNOSIS — E78.5 HYPERLIPIDEMIA, UNSPECIFIED: ICD-10-CM

## 2024-06-14 DIAGNOSIS — Z01.419 ENCOUNTER FOR GYNECOLOGICAL EXAMINATION (GENERAL) (ROUTINE) W/OUT ABNORMAL FINDINGS: ICD-10-CM

## 2024-06-14 PROCEDURE — 99213 OFFICE O/P EST LOW 20 MIN: CPT

## 2024-06-14 NOTE — PHYSICAL EXAM
[Chaperone Present] : A chaperone was present in the examining room during all aspects of the physical examination [[id number]] : A chaperone was present during the pelvic exam. [FreeTextEntry2] : ERMA Gilman [Appropriately responsive] : appropriately responsive [Alert] : alert [No Acute Distress] : no acute distress [No Lymphadenopathy] : no lymphadenopathy [Regular Rate Rhythm] : regular rate rhythm [No Murmurs] : no murmurs [Clear to Auscultation B/L] : clear to auscultation bilaterally [Soft] : soft [Non-tender] : non-tender [Non-distended] : non-distended [No HSM] : No HSM [No Lesions] : no lesions [No Mass] : no mass [Oriented x3] : oriented x3 [Examination Of The Breasts] : a normal appearance [No Masses] : no breast masses were palpable [Vulvar Atrophy] : vulvar atrophy [Labia Majora] : normal [Labia Minora] : normal [Atrophy] : atrophy [No Bleeding] : There was no active vaginal bleeding [Normal] : normal [Tenderness] : nontender [Retroversion] : retroverted [Enlarged ___ wks] : enlarged [unfilled] ~Uweeks [Uterine Adnexae] : non-palpable [FreeTextEntry6] : 8 week sized uterus, slightly globular, retroverted, mobile

## 2024-06-14 NOTE — HISTORY OF PRESENT ILLNESS
[FreeTextEntry1] : Doing well, denying chest pain, shortness of breath, palpitations or dizziness Walks every day  Her joints/ muscles feel great

## 2024-06-14 NOTE — COUNSELING
[Nutrition/ Exercise/ Weight Management] : nutrition, exercise, weight management [Vitamins/Supplements] : vitamins/supplements [Sunscreen] : sunscreen [Drugs] : drugs [Breast Self Exam] : breast self exam

## 2024-06-14 NOTE — HISTORY OF PRESENT ILLNESS
[Patient reported mammogram was normal] : Patient reported mammogram was normal [Patient reported PAP Smear was normal] : Patient reported PAP Smear was normal [Patient reported bone density results were abnormal] : Patient reported bone density results were abnormal [Patient reported colonoscopy was normal] : Patient reported colonoscopy was normal [postmenopausal] : postmenopausal [Mammogramdate] : 4/2023 [PapSmeardate] : 2020 [BoneDensityDate] : 2/2024 [TextBox_37] : osteopenia [ColonoscopyDate] : 2/2023 [TextBox_43] : return 5 years [PGHxTotal] : 2 [[facility]] : 2 [PGHxPremature] : 0 [PGHxAbortions] : 0 [[facility] CenterxLiving] : 2 [FreeTextEntry1] : SVDx2 [Previously active] : previously active [Men] : men

## 2024-06-18 LAB — HPV HIGH+LOW RISK DNA PNL CVX: NOT DETECTED

## 2024-06-21 LAB — CYTOLOGY CVX/VAG DOC THIN PREP: ABNORMAL

## 2024-06-24 LAB
ALBUMIN SERPL ELPH-MCNC: 4.5 G/DL
ALP BLD-CCNC: 80 U/L
ALT SERPL-CCNC: 31 U/L
AST SERPL-CCNC: 32 U/L
BILIRUB DIRECT SERPL-MCNC: 0.1 MG/DL
BILIRUB INDIRECT SERPL-MCNC: 0.3 MG/DL
BILIRUB SERPL-MCNC: 0.4 MG/DL
CHOLEST SERPL-MCNC: 219 MG/DL
CK BB SERPL ELPH-CCNC: 0 % (ref 0–?)
CK MB CFR SERPL ELPH: 0 %
CK MM SERPL ELPH-CCNC: 100 %
CREATINE KINASE,TOTAL,SERUM: 250 U/L
HDLC SERPL-MCNC: 56 MG/DL
LDLC SERPL CALC-MCNC: 148 MG/DL
MACRO TYPE 1: 0 %
MACRO TYPE 2: 0 %
NONHDLC SERPL-MCNC: 163 MG/DL
PROT SERPL-MCNC: 7.7 G/DL
TRIGL SERPL-MCNC: 85 MG/DL

## 2024-06-27 ENCOUNTER — NON-APPOINTMENT (OUTPATIENT)
Age: 69
End: 2024-06-27

## 2024-07-05 RX ORDER — EVOLOCUMAB 140 MG/ML
140 INJECTION, SOLUTION SUBCUTANEOUS
Qty: 3 | Refills: 3 | Status: ACTIVE | COMMUNITY
Start: 2024-07-05 | End: 1900-01-01

## 2024-07-10 ENCOUNTER — APPOINTMENT (OUTPATIENT)
Dept: CARDIOLOGY | Facility: CLINIC | Age: 69
End: 2024-07-10
Payer: MEDICARE

## 2024-07-10 PROCEDURE — 11950 SUBQ NJX FILLING MATRL 1CC/<: CPT

## 2024-07-10 RX ORDER — EVOLOCUMAB 140 MG/ML
140 INJECTION, SOLUTION SUBCUTANEOUS
Refills: 0 | Status: COMPLETED | OUTPATIENT
Start: 2024-07-10

## 2024-07-10 RX ADMIN — EVOLOCUMAB 0 MG/ML: 140 INJECTION, SOLUTION SUBCUTANEOUS at 00:00

## 2024-07-19 ENCOUNTER — RESULT REVIEW (OUTPATIENT)
Age: 69
End: 2024-07-19

## 2024-07-24 ENCOUNTER — APPOINTMENT (OUTPATIENT)
Dept: CARDIOLOGY | Facility: CLINIC | Age: 69
End: 2024-07-24
Payer: MEDICARE

## 2024-07-24 PROCEDURE — ZZZZZ: CPT | Mod: NC

## 2024-08-26 ENCOUNTER — APPOINTMENT (OUTPATIENT)
Dept: ENDOCRINOLOGY | Facility: CLINIC | Age: 69
End: 2024-08-26
Payer: MEDICARE

## 2024-08-26 VITALS
OXYGEN SATURATION: 99 % | DIASTOLIC BLOOD PRESSURE: 60 MMHG | HEART RATE: 78 BPM | BODY MASS INDEX: 22.67 KG/M2 | HEIGHT: 58 IN | WEIGHT: 108 LBS | SYSTOLIC BLOOD PRESSURE: 108 MMHG

## 2024-08-26 DIAGNOSIS — C73 MALIGNANT NEOPLASM OF THYROID GLAND: ICD-10-CM

## 2024-08-26 DIAGNOSIS — E03.9 HYPOTHYROIDISM, UNSPECIFIED: ICD-10-CM

## 2024-08-26 PROCEDURE — 99215 OFFICE O/P EST HI 40 MIN: CPT

## 2024-08-26 NOTE — HISTORY OF PRESENT ILLNESS
[FreeTextEntry1] : Aug 26, 2024    in person with old records.                22 A1c 5.8%  needs ELI Synthroid  112   calcitriol 0.25 BID   calcium 600  Surgery at [facility]   Dr. Cr navarrete I-131     she will bring pathology    Her records in storage  PCP:  Dr. Tyra Wagner c/o [facility] [place]           Surgeon - Dr. Hankins            Endocrinology Dr. Tawanna Ferraro in [place] p    f                          20 Tg undetectable  TPO 50   TgAb < 20   on Lt4 112  calcitriol 0.25 daily, calcium carb 600 m BID   CC:  History of thyroid cancer ~  - surgery at [facility] - Dr. Hankins - rosalba [id number]   2023 US neck: reassuring          Possible hypoparathyroidism - takes calcitriol and calcium        2022 25 OH vit D 41  (Dr. Rula Ferraro)               2022 [facility]  T scores:  LS -0.9;  TH 0.8  FN 0.9  1/3 FA  0   69 yo recently moved to this area from [place].  2004 retinal detachment  Reports thyroid cancer    - operated on at [facility] by Dr. Hankins. Her records are in her hands today.and go all the way back to  and do not include the information regarding her surgery  at [facility].    24 Coy Tg undetectable  Believes US neck performed last year at .    : : Constitutional:  Alert, well nourished, healthy appearance, no acute distress  Eyes:  No proptosis, no stare Thyroid: Pulmonary:  No respiratory distress, no accessory muscle use; normal rate and effort Cardiac:  Normal rate Vascular:  Endocrine:  No stigmata of Cushings Syndrome Musculoskeletal:  Normal gait, no involuntary movements Neurology:  No tremors Affect/Mood/Psych:  Oriented x 3; normal affect, normal insight/judgement, normal mood  . Imp/Plan:  Hypopara controlled with [employer] caclium carbonate 600 m tabs BID Synthroid  Calcitriol 0.25 once a day           2024     in person                    22 A1c 5.8%  needs ELI Synthroid  112   calcitriol 0.25 BID   calcium 600  Surgery at [facility]   Dr. Cr navarrete I-131     she will bring pathology    Her records in storage  PCP:  Dr. Tyra Wagner c/o [facility] [place]           Surgeon - Dr. Hankins  CC:  History of thyroid cancer ~ 14 years ago - surgery at [facility] - Dr. Hankins - no I-131   2023 US neck: reassuring          Possible hypoparathyroidism - takes calcitriol and calcium        2022 25 OH vit D 41  (Dr. Rula Ferraro)               2022 [facility]  T scores:  LS -0.9;  TH 0.8  FN 0.9  1/3 FA  0   69 yo recently moved to this area from [place]. Reports thyroid cancer 14 years ago - operated on at [facility] by Dr. Hankins. Her records are in storage.  : : Constitutional:  Alert, well nourished, healthy appearance, no acute distress  Eyes:  No proptosis, no stare Thyroid:  No palpable tissue Pulmonary:  No respiratory distress, no accessory muscle use; normal rate and effort Cardiac:  Normal rate Vascular:  Endocrine:  No stigmata of Cushings Syndrome Musculoskeletal:  Normal gait, no involuntary movements Neurology:  No tremors Affect/Mood/Psych:  Oriented x 3; normal affect, normal insight/judgement, normal mood  . Impression:  Hypothyroid on Synthroid 112 mcg daily. Plan:  Review pathology report when available. Updated labs today. JANET Schilling.

## 2024-08-26 NOTE — HISTORY OF PRESENT ILLNESS
[FreeTextEntry1] : Aug 26, 2024    in person with old records.                22 A1c 5.8%  needs ELI Synthroid  112   calcitriol 0.25 BID   calcium 600  Surgery at [facility]   Dr. Cr navarrete I-131     she will bring pathology    Her records in storage  PCP:  Dr. Tyra Wagner c/o [facility] [place]           Surgeon - Dr. Hankins            Endocrinology Dr. Tawanna Ferraro in [place] p    f                          20 Tg undetectable  TPO 50   TgAb < 20   on Lt4 112  calcitriol 0.25 daily, calcium carb 600 m BID   CC:  History of thyroid cancer ~  - surgery at [facility] - Dr. Hankins - rosalba [id number]   2023 US neck: reassuring          Possible hypoparathyroidism - takes calcitriol and calcium        2022 25 OH vit D 41  (Dr. Rula Ferraro)               2022 [facility]  T scores:  LS -0.9;  TH 0.8  FN 0.9  1/3 FA  0   69 yo recently moved to this area from [place].  2004 retinal detachment  Reports thyroid cancer    - operated on at [facility] by Dr. Hankins. Her records are in her hands today.and go all the way back to  and do not include the information regarding her surgery  at [facility].    24 Coy Tg undetectable  Believes US neck performed last year at .    : : Constitutional:  Alert, well nourished, healthy appearance, no acute distress  Eyes:  No proptosis, no stare Thyroid: Pulmonary:  No respiratory distress, no accessory muscle use; normal rate and effort Cardiac:  Normal rate Vascular:  Endocrine:  No stigmata of Cushings Syndrome Musculoskeletal:  Normal gait, no involuntary movements Neurology:  No tremors Affect/Mood/Psych:  Oriented x 3; normal affect, normal insight/judgement, normal mood  . Imp/Plan:  Hypopara controlled with [employer] caclium carbonate 600 m tabs BID Synthroid  Calcitriol 0.25 once a day           2024     in person                    22 A1c 5.8%  needs ELI Synthroid  112   calcitriol 0.25 BID   calcium 600  Surgery at [facility]   Dr. Cr navarrete I-131     she will bring pathology    Her records in storage  PCP:  Dr. Tyra Wagner c/o [facility] [place]           Surgeon - Dr. Hankins  CC:  History of thyroid cancer ~ 14 years ago - surgery at [facility] - Dr. Hankins - no I-131   2023 US neck: reassuring          Possible hypoparathyroidism - takes calcitriol and calcium        2022 25 OH vit D 41  (Dr. Rula Ferraro)               2022 [facility]  T scores:  LS -0.9;  TH 0.8  FN 0.9  1/3 FA  0   69 yo recently moved to this area from [place]. Reports thyroid cancer 14 years ago - operated on at [facility] by Dr. Hankins. Her records are in storage.  : : Constitutional:  Alert, well nourished, healthy appearance, no acute distress  Eyes:  No proptosis, no stare Thyroid:  No palpable tissue Pulmonary:  No respiratory distress, no accessory muscle use; normal rate and effort Cardiac:  Normal rate Vascular:  Endocrine:  No stigmata of Cushings Syndrome Musculoskeletal:  Normal gait, no involuntary movements Neurology:  No tremors Affect/Mood/Psych:  Oriented x 3; normal affect, normal insight/judgement, normal mood  . Impression:  Hypothyroid on Synthroid 112 mcg daily. Plan:  Review pathology report when available. Updated labs today. JANET Schilling.

## 2024-09-15 NOTE — PHYSICAL EXAM
Patient is in the supine position.   The body was positioned using the following devices: safety strap. [No Acute Distress] : no acute distress [Well Nourished] : well nourished [Well-Appearing] : well-appearing [No Lymphadenopathy] : no lymphadenopathy [Supple] : supple [No Respiratory Distress] : no respiratory distress  [No Accessory Muscle Use] : no accessory muscle use [Clear to Auscultation] : lungs were clear to auscultation bilaterally [Normal Rate] : normal rate  [Regular Rhythm] : with a regular rhythm [Normal S1, S2] : normal S1 and S2 [No Murmur] : no murmur heard [No Joint Swelling] : no joint swelling [Grossly Normal Strength/Tone] : grossly normal strength/tone [Coordination Grossly Intact] : coordination grossly intact [No Focal Deficits] : no focal deficits [Normal Gait] : normal gait [Deep Tendon Reflexes (DTR)] : deep tendon reflexes were 2+ and symmetric [Speech Grossly Normal] : speech grossly normal [Memory Grossly Normal] : memory grossly normal [Normal Affect] : the affect was normal [Alert and Oriented x3] : oriented to person, place, and time

## 2024-09-18 ENCOUNTER — NON-APPOINTMENT (OUTPATIENT)
Age: 69
End: 2024-09-18

## 2024-09-19 ENCOUNTER — NON-APPOINTMENT (OUTPATIENT)
Age: 69
End: 2024-09-19

## 2024-09-19 ENCOUNTER — APPOINTMENT (OUTPATIENT)
Dept: CARDIOLOGY | Facility: CLINIC | Age: 69
End: 2024-09-19
Payer: MEDICARE

## 2024-09-19 VITALS
HEART RATE: 75 BPM | OXYGEN SATURATION: 98 % | DIASTOLIC BLOOD PRESSURE: 75 MMHG | BODY MASS INDEX: 22.25 KG/M2 | HEIGHT: 58 IN | SYSTOLIC BLOOD PRESSURE: 133 MMHG | WEIGHT: 106 LBS

## 2024-09-19 VITALS — SYSTOLIC BLOOD PRESSURE: 110 MMHG | DIASTOLIC BLOOD PRESSURE: 64 MMHG

## 2024-09-19 DIAGNOSIS — I38 ENDOCARDITIS, VALVE UNSPECIFIED: ICD-10-CM

## 2024-09-19 DIAGNOSIS — Z92.89 PERSONAL HISTORY OF OTHER MEDICAL TREATMENT: ICD-10-CM

## 2024-09-19 DIAGNOSIS — E78.5 HYPERLIPIDEMIA, UNSPECIFIED: ICD-10-CM

## 2024-09-19 DIAGNOSIS — R93.1 ABNORMAL FINDINGS ON DIAGNOSTIC IMAGING OF HEART AND CORONARY CIRCULATION: ICD-10-CM

## 2024-09-19 DIAGNOSIS — R73.03 PREDIABETES.: ICD-10-CM

## 2024-09-19 DIAGNOSIS — I65.29 OCCLUSION AND STENOSIS OF UNSPECIFIED CAROTID ARTERY: ICD-10-CM

## 2024-09-19 PROCEDURE — 99214 OFFICE O/P EST MOD 30 MIN: CPT | Mod: 25

## 2024-09-19 PROCEDURE — 93000 ELECTROCARDIOGRAM COMPLETE: CPT

## 2024-09-20 NOTE — HISTORY OF PRESENT ILLNESS
[FreeTextEntry1] : Going to [facility] in mid-October for 2-3 months, to sell her house She's asking about the altitude -. was there 1 ya   Walks at moderate pace, for 1 hour, 5 days a week

## 2024-09-20 NOTE — REASON FOR VISIT
[Hyperlipidemia] : hyperlipidemia [Carotid, Aortic and Peripheral Vascular Disease] : carotid, aortic and peripheral vascular disease [FreeTextEntry3] : Dr Jessica Abel

## 2024-09-20 NOTE — RESULTS/DATA
[TextEntry] : EKG shows sinus rhythm at 72, left anterior fascicular block (compared to 2/2/2024, heart rate is faster by 13 bpm

## 2024-10-10 ENCOUNTER — APPOINTMENT (OUTPATIENT)
Dept: CARDIOLOGY | Facility: CLINIC | Age: 69
End: 2024-10-10

## 2025-01-21 ENCOUNTER — APPOINTMENT (OUTPATIENT)
Dept: ENDOCRINOLOGY | Facility: CLINIC | Age: 70
End: 2025-01-21

## 2025-03-21 NOTE — ED ADULT NURSE NOTE - SUICIDE SCREENING DEPRESSION
Patient is scheduled for a/an Colonoscopy ([id number]) with NuLytely, under MAC, with Aliyah Ch MD on 5/27/2025 at [facility].      Prep instructions were mailed to patient and via [employer]    Nurse:   Please send the procedure prep of GoLytely and Zofran/Ondansetron (anti-nausea medication) to the patient's pharmacy and contact patient for any pre-procedure concerns or questions.     [id number] [address]     Patient is needing a PCP referral for a Pre-Op.    Negative

## 2025-06-30 ENCOUNTER — APPOINTMENT (OUTPATIENT)
Dept: ENDOCRINOLOGY | Facility: CLINIC | Age: 70
End: 2025-06-30